# Patient Record
Sex: MALE | Race: WHITE
[De-identification: names, ages, dates, MRNs, and addresses within clinical notes are randomized per-mention and may not be internally consistent; named-entity substitution may affect disease eponyms.]

---

## 2020-01-06 ENCOUNTER — HOSPITAL ENCOUNTER (OUTPATIENT)
Dept: HOSPITAL 8 - STAR | Age: 50
Discharge: HOME | End: 2020-01-06
Attending: NEUROLOGICAL SURGERY
Payer: MEDICAID

## 2020-01-06 DIAGNOSIS — M51.36: ICD-10-CM

## 2020-01-06 DIAGNOSIS — Z01.818: Primary | ICD-10-CM

## 2020-01-06 LAB
ANION GAP SERPL CALC-SCNC: 4 MMOL/L (ref 5–15)
APTT BLD: 27 SECONDS (ref 25–31)
BASOPHILS # BLD AUTO: 0.05 X10^3/UL (ref 0–0.1)
BASOPHILS NFR BLD AUTO: 1 % (ref 0–1)
CALCIUM SERPL-MCNC: 9.3 MG/DL (ref 8.5–10.1)
CHLORIDE SERPL-SCNC: 105 MMOL/L (ref 98–107)
CREAT SERPL-MCNC: 0.87 MG/DL (ref 0.7–1.3)
EOSINOPHIL # BLD AUTO: 0.34 X10^3/UL (ref 0–0.4)
EOSINOPHIL NFR BLD AUTO: 6 % (ref 1–7)
ERYTHROCYTE [DISTWIDTH] IN BLOOD BY AUTOMATED COUNT: 12.8 % (ref 9.4–14.8)
INR PPP: 0.97 (ref 0.93–1.1)
LYMPHOCYTES # BLD AUTO: 2.34 X10^3/UL (ref 1–3.4)
LYMPHOCYTES NFR BLD AUTO: 39 % (ref 22–44)
MCH RBC QN AUTO: 31.7 PG (ref 27.5–34.5)
MCHC RBC AUTO-ENTMCNC: 34.2 G/DL (ref 33.2–36.2)
MCV RBC AUTO: 92.7 FL (ref 81–97)
MD: NO
MICROSCOPIC: (no result)
MONOCYTES # BLD AUTO: 0.43 X10^3/UL (ref 0.2–0.8)
MONOCYTES NFR BLD AUTO: 7 % (ref 2–9)
NEUTROPHILS # BLD AUTO: 2.83 X10^3/UL (ref 1.8–6.8)
NEUTROPHILS NFR BLD AUTO: 47 % (ref 42–75)
PLATELET # BLD AUTO: 272 X10^3/UL (ref 130–400)
PMV BLD AUTO: 7.3 FL (ref 7.4–10.4)
PROTHROMBIN TIME: 10.2 SECONDS (ref 9.6–11.5)
RBC # BLD AUTO: 4.96 X10^6/UL (ref 4.38–5.82)

## 2020-01-06 PROCEDURE — 80048 BASIC METABOLIC PNL TOTAL CA: CPT

## 2020-01-06 PROCEDURE — 85730 THROMBOPLASTIN TIME PARTIAL: CPT

## 2020-01-06 PROCEDURE — 85025 COMPLETE CBC W/AUTO DIFF WBC: CPT

## 2020-01-06 PROCEDURE — 81003 URINALYSIS AUTO W/O SCOPE: CPT

## 2020-01-06 PROCEDURE — 71046 X-RAY EXAM CHEST 2 VIEWS: CPT

## 2020-01-06 PROCEDURE — 36415 COLL VENOUS BLD VENIPUNCTURE: CPT

## 2020-01-06 PROCEDURE — 93005 ELECTROCARDIOGRAM TRACING: CPT

## 2020-01-06 PROCEDURE — 85610 PROTHROMBIN TIME: CPT

## 2020-03-03 ENCOUNTER — HOSPITAL ENCOUNTER (OUTPATIENT)
Dept: HOSPITAL 8 - STAR | Age: 50
Discharge: HOME | End: 2020-03-03
Attending: NEUROLOGICAL SURGERY
Payer: COMMERCIAL

## 2020-03-03 DIAGNOSIS — M51.36: ICD-10-CM

## 2020-03-03 DIAGNOSIS — M48.061: ICD-10-CM

## 2020-03-03 DIAGNOSIS — Z01.818: Primary | ICD-10-CM

## 2020-03-03 LAB
ANION GAP SERPL CALC-SCNC: 5 MMOL/L (ref 5–15)
APTT BLD: 26 SECONDS (ref 25–31)
BASOPHILS # BLD AUTO: 0.03 X10^3/UL (ref 0–0.1)
BASOPHILS NFR BLD AUTO: 1 % (ref 0–1)
CALCIUM SERPL-MCNC: 9 MG/DL (ref 8.5–10.1)
CHLORIDE SERPL-SCNC: 107 MMOL/L (ref 98–107)
CREAT SERPL-MCNC: 0.86 MG/DL (ref 0.7–1.3)
CULTURE INDICATED?: NO
EOSINOPHIL # BLD AUTO: 0.31 X10^3/UL (ref 0–0.4)
EOSINOPHIL NFR BLD AUTO: 6 % (ref 1–7)
ERYTHROCYTE [DISTWIDTH] IN BLOOD BY AUTOMATED COUNT: 12.8 % (ref 9.4–14.8)
INR PPP: 0.96 (ref 0.93–1.1)
LYMPHOCYTES # BLD AUTO: 1.91 X10^3/UL (ref 1–3.4)
LYMPHOCYTES NFR BLD AUTO: 37 % (ref 22–44)
MCH RBC QN AUTO: 31.4 PG (ref 27.5–34.5)
MCHC RBC AUTO-ENTMCNC: 33.9 G/DL (ref 33.2–36.2)
MCV RBC AUTO: 92.7 FL (ref 81–97)
MD: NO
MICROSCOPIC: (no result)
MONOCYTES # BLD AUTO: 0.33 X10^3/UL (ref 0.2–0.8)
MONOCYTES NFR BLD AUTO: 6 % (ref 2–9)
NEUTROPHILS # BLD AUTO: 2.64 X10^3/UL (ref 1.8–6.8)
NEUTROPHILS NFR BLD AUTO: 51 % (ref 42–75)
PLATELET # BLD AUTO: 272 X10^3/UL (ref 130–400)
PMV BLD AUTO: 7.6 FL (ref 7.4–10.4)
PROTHROMBIN TIME: 10.2 SECONDS (ref 9.6–11.5)
RBC # BLD AUTO: 5.01 X10^6/UL (ref 4.38–5.82)

## 2020-03-03 PROCEDURE — 80048 BASIC METABOLIC PNL TOTAL CA: CPT

## 2020-03-03 PROCEDURE — 81003 URINALYSIS AUTO W/O SCOPE: CPT

## 2020-03-03 PROCEDURE — 85025 COMPLETE CBC W/AUTO DIFF WBC: CPT

## 2020-03-03 PROCEDURE — 36415 COLL VENOUS BLD VENIPUNCTURE: CPT

## 2020-03-03 PROCEDURE — 85610 PROTHROMBIN TIME: CPT

## 2020-03-03 PROCEDURE — 85730 THROMBOPLASTIN TIME PARTIAL: CPT

## 2021-07-04 ENCOUNTER — HOSPITAL ENCOUNTER (INPATIENT)
Facility: MEDICAL CENTER | Age: 51
LOS: 4 days | DRG: 029 | End: 2021-07-09
Attending: EMERGENCY MEDICINE | Admitting: SURGERY
Payer: COMMERCIAL

## 2021-07-04 ENCOUNTER — APPOINTMENT (OUTPATIENT)
Dept: RADIOLOGY | Facility: MEDICAL CENTER | Age: 51
DRG: 029 | End: 2021-07-04
Attending: EMERGENCY MEDICINE
Payer: COMMERCIAL

## 2021-07-04 DIAGNOSIS — R20.2 PARESTHESIAS: ICD-10-CM

## 2021-07-04 DIAGNOSIS — S14.109A: ICD-10-CM

## 2021-07-04 DIAGNOSIS — R29.5 TRANSIENT PARALYSIS: ICD-10-CM

## 2021-07-04 DIAGNOSIS — I48.91 ATRIAL FIBRILLATION, UNSPECIFIED TYPE (HCC): ICD-10-CM

## 2021-07-04 LAB
ABO GROUP BLD: NORMAL
APTT PPP: 29.6 SEC (ref 24.7–36)
BLD GP AB SCN SERPL QL: NORMAL
ERYTHROCYTE [DISTWIDTH] IN BLOOD BY AUTOMATED COUNT: 37.9 FL (ref 35.9–50)
ETHANOL BLD-MCNC: <10.1 MG/DL (ref 0–10)
HCT VFR BLD AUTO: 40.7 % (ref 42–52)
HGB BLD-MCNC: 14.7 G/DL (ref 14–18)
INR PPP: 1.02 (ref 0.87–1.13)
MCH RBC QN AUTO: 31.7 PG (ref 27–33)
MCHC RBC AUTO-ENTMCNC: 36.1 G/DL (ref 33.7–35.3)
MCV RBC AUTO: 87.9 FL (ref 81.4–97.8)
PLATELET # BLD AUTO: 241 K/UL (ref 164–446)
PMV BLD AUTO: 9.5 FL (ref 9–12.9)
PROTHROMBIN TIME: 13.1 SEC (ref 12–14.6)
RBC # BLD AUTO: 4.63 M/UL (ref 4.7–6.1)
RH BLD: NORMAL
WBC # BLD AUTO: 8.3 K/UL (ref 4.8–10.8)

## 2021-07-04 PROCEDURE — 85610 PROTHROMBIN TIME: CPT

## 2021-07-04 PROCEDURE — 82077 ASSAY SPEC XCP UR&BREATH IA: CPT

## 2021-07-04 PROCEDURE — 72125 CT NECK SPINE W/O DYE: CPT

## 2021-07-04 PROCEDURE — 305949 HCHG RED TRAUMA ACT PRE-NOTIFY NO CC

## 2021-07-04 PROCEDURE — 700111 HCHG RX REV CODE 636 W/ 250 OVERRIDE (IP): Performed by: EMERGENCY MEDICINE

## 2021-07-04 PROCEDURE — 99285 EMERGENCY DEPT VISIT HI MDM: CPT

## 2021-07-04 PROCEDURE — 71045 X-RAY EXAM CHEST 1 VIEW: CPT

## 2021-07-04 PROCEDURE — 85730 THROMBOPLASTIN TIME PARTIAL: CPT

## 2021-07-04 PROCEDURE — 80053 COMPREHEN METABOLIC PANEL: CPT

## 2021-07-04 PROCEDURE — 86900 BLOOD TYPING SEROLOGIC ABO: CPT

## 2021-07-04 PROCEDURE — 86901 BLOOD TYPING SEROLOGIC RH(D): CPT

## 2021-07-04 PROCEDURE — 96374 THER/PROPH/DIAG INJ IV PUSH: CPT

## 2021-07-04 PROCEDURE — 85027 COMPLETE CBC AUTOMATED: CPT

## 2021-07-04 PROCEDURE — 70450 CT HEAD/BRAIN W/O DYE: CPT

## 2021-07-04 PROCEDURE — 86850 RBC ANTIBODY SCREEN: CPT

## 2021-07-04 RX ORDER — HYDROCODONE BITARTRATE AND ACETAMINOPHEN 10; 325 MG/1; MG/1
1-2 TABLET ORAL EVERY 6 HOURS PRN
Status: ON HOLD | COMMUNITY
End: 2021-07-09

## 2021-07-04 RX ORDER — HYDROMORPHONE HYDROCHLORIDE 1 MG/ML
0.5 INJECTION, SOLUTION INTRAMUSCULAR; INTRAVENOUS; SUBCUTANEOUS ONCE
Status: COMPLETED | OUTPATIENT
Start: 2021-07-05 | End: 2021-07-04

## 2021-07-04 RX ORDER — IBUPROFEN 200 MG
400 TABLET ORAL EVERY 6 HOURS PRN
Status: ON HOLD | COMMUNITY
End: 2021-07-09

## 2021-07-04 RX ADMIN — HYDROMORPHONE HYDROCHLORIDE 0.5 MG: 1 INJECTION, SOLUTION INTRAMUSCULAR; INTRAVENOUS; SUBCUTANEOUS at 23:59

## 2021-07-05 ENCOUNTER — HOSPITAL ENCOUNTER (INPATIENT)
Facility: REHABILITATION | Age: 51
End: 2021-07-05
Attending: PHYSICAL MEDICINE & REHABILITATION | Admitting: PHYSICAL MEDICINE & REHABILITATION
Payer: COMMERCIAL

## 2021-07-05 ENCOUNTER — APPOINTMENT (OUTPATIENT)
Dept: RADIOLOGY | Facility: MEDICAL CENTER | Age: 51
DRG: 029 | End: 2021-07-05
Attending: NURSE PRACTITIONER
Payer: COMMERCIAL

## 2021-07-05 PROBLEM — S14.129A CENTRAL CORD SYNDROME (HCC): Status: ACTIVE | Noted: 2021-07-05

## 2021-07-05 PROBLEM — R20.2 PARESTHESIA: Status: ACTIVE | Noted: 2021-07-05

## 2021-07-05 PROBLEM — R29.5 TRANSIENT PARALYSIS: Status: RESOLVED | Noted: 2021-07-05 | Resolved: 2021-07-05

## 2021-07-05 PROBLEM — Z11.52 ENCOUNTER FOR SCREENING FOR COVID-19: Status: ACTIVE | Noted: 2021-07-05
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
## 2021-07-05 PROBLEM — Z11.52 ENCOUNTER FOR SCREENING FOR COVID-19: Status: RESOLVED | Noted: 2021-07-05 | Resolved: 2021-07-05

## 2021-07-05 PROBLEM — Z53.09 CONTRAINDICATION TO DEEP VEIN THROMBOSIS (DVT) PROPHYLAXIS: Status: ACTIVE | Noted: 2021-07-05

## 2021-07-05 PROBLEM — T14.90XA TRAUMA: Status: ACTIVE | Noted: 2021-07-05

## 2021-07-05 PROBLEM — R29.5 TRANSIENT PARALYSIS: Status: ACTIVE | Noted: 2021-07-05

## 2021-07-05 LAB
ALBUMIN SERPL BCP-MCNC: 4.2 G/DL (ref 3.2–4.9)
ALBUMIN/GLOB SERPL: 1.4 G/DL
ALP SERPL-CCNC: 48 U/L (ref 30–99)
ALT SERPL-CCNC: 37 U/L (ref 2–50)
AMPHET UR QL SCN: NEGATIVE
ANION GAP SERPL CALC-SCNC: 12 MMOL/L (ref 7–16)
AST SERPL-CCNC: 33 U/L (ref 12–45)
BARBITURATES UR QL SCN: NEGATIVE
BENZODIAZ UR QL SCN: NEGATIVE
BILIRUB SERPL-MCNC: 0.5 MG/DL (ref 0.1–1.5)
BUN SERPL-MCNC: 20 MG/DL (ref 8–22)
BZE UR QL SCN: NEGATIVE
CALCIUM SERPL-MCNC: 9.1 MG/DL (ref 8.5–10.5)
CANNABINOIDS UR QL SCN: NEGATIVE
CHLORIDE SERPL-SCNC: 100 MMOL/L (ref 96–112)
CO2 SERPL-SCNC: 23 MMOL/L (ref 20–33)
CREAT SERPL-MCNC: 0.77 MG/DL (ref 0.5–1.4)
EKG IMPRESSION: NORMAL
GLOBULIN SER CALC-MCNC: 2.9 G/DL (ref 1.9–3.5)
GLUCOSE SERPL-MCNC: 86 MG/DL (ref 65–99)
METHADONE UR QL SCN: NEGATIVE
OPIATES UR QL SCN: NEGATIVE
OXYCODONE UR QL SCN: NEGATIVE
PCP UR QL SCN: NEGATIVE
POTASSIUM SERPL-SCNC: 3.8 MMOL/L (ref 3.6–5.5)
PROPOXYPH UR QL SCN: NEGATIVE
PROT SERPL-MCNC: 7.1 G/DL (ref 6–8.2)
SODIUM SERPL-SCNC: 135 MMOL/L (ref 135–145)

## 2021-07-05 PROCEDURE — 72156 MRI NECK SPINE W/O & W/DYE: CPT

## 2021-07-05 PROCEDURE — 80307 DRUG TEST PRSMV CHEM ANLYZR: CPT

## 2021-07-05 PROCEDURE — 700102 HCHG RX REV CODE 250 W/ 637 OVERRIDE(OP): Performed by: NURSE PRACTITIONER

## 2021-07-05 PROCEDURE — A9270 NON-COVERED ITEM OR SERVICE: HCPCS | Performed by: NURSE PRACTITIONER

## 2021-07-05 PROCEDURE — 700105 HCHG RX REV CODE 258: Performed by: NURSE PRACTITIONER

## 2021-07-05 PROCEDURE — A9270 NON-COVERED ITEM OR SERVICE: HCPCS | Performed by: SURGERY

## 2021-07-05 PROCEDURE — 51798 US URINE CAPACITY MEASURE: CPT

## 2021-07-05 PROCEDURE — A9270 NON-COVERED ITEM OR SERVICE: HCPCS | Performed by: NEUROLOGICAL SURGERY

## 2021-07-05 PROCEDURE — A9576 INJ PROHANCE MULTIPACK: HCPCS | Performed by: SURGERY

## 2021-07-05 PROCEDURE — 700117 HCHG RX CONTRAST REV CODE 255: Performed by: SURGERY

## 2021-07-05 PROCEDURE — 700111 HCHG RX REV CODE 636 W/ 250 OVERRIDE (IP): Performed by: NURSE PRACTITIONER

## 2021-07-05 PROCEDURE — 700102 HCHG RX REV CODE 250 W/ 637 OVERRIDE(OP): Performed by: SURGERY

## 2021-07-05 PROCEDURE — 93005 ELECTROCARDIOGRAM TRACING: CPT

## 2021-07-05 PROCEDURE — 700102 HCHG RX REV CODE 250 W/ 637 OVERRIDE(OP): Performed by: NEUROLOGICAL SURGERY

## 2021-07-05 PROCEDURE — 770022 HCHG ROOM/CARE - ICU (200)

## 2021-07-05 RX ORDER — AMOXICILLIN 250 MG
1 CAPSULE ORAL NIGHTLY
Status: DISCONTINUED | OUTPATIENT
Start: 2021-07-05 | End: 2021-07-08

## 2021-07-05 RX ORDER — ONDANSETRON 2 MG/ML
4 INJECTION INTRAMUSCULAR; INTRAVENOUS EVERY 4 HOURS PRN
Status: DISCONTINUED | OUTPATIENT
Start: 2021-07-05 | End: 2021-07-08

## 2021-07-05 RX ORDER — ENEMA 19; 7 G/133ML; G/133ML
1 ENEMA RECTAL
Status: DISCONTINUED | OUTPATIENT
Start: 2021-07-05 | End: 2021-07-08

## 2021-07-05 RX ORDER — BISACODYL 10 MG
10 SUPPOSITORY, RECTAL RECTAL
Status: DISCONTINUED | OUTPATIENT
Start: 2021-07-05 | End: 2021-07-08

## 2021-07-05 RX ORDER — MIDODRINE HYDROCHLORIDE 5 MG/1
10 TABLET ORAL
Status: DISCONTINUED | OUTPATIENT
Start: 2021-07-05 | End: 2021-07-05

## 2021-07-05 RX ORDER — OXYCODONE HYDROCHLORIDE 10 MG/1
10 TABLET ORAL
Status: DISCONTINUED | OUTPATIENT
Start: 2021-07-05 | End: 2021-07-08

## 2021-07-05 RX ORDER — DOCUSATE SODIUM 100 MG/1
100 CAPSULE, LIQUID FILLED ORAL 2 TIMES DAILY
Status: DISCONTINUED | OUTPATIENT
Start: 2021-07-05 | End: 2021-07-08

## 2021-07-05 RX ORDER — ACETAMINOPHEN 500 MG
1000 TABLET ORAL EVERY 6 HOURS
Status: DISCONTINUED | OUTPATIENT
Start: 2021-07-05 | End: 2021-07-05

## 2021-07-05 RX ORDER — MIDODRINE HYDROCHLORIDE 5 MG/1
5 TABLET ORAL
Status: DISCONTINUED | OUTPATIENT
Start: 2021-07-05 | End: 2021-07-06

## 2021-07-05 RX ORDER — POLYETHYLENE GLYCOL 3350 17 G/17G
1 POWDER, FOR SOLUTION ORAL 2 TIMES DAILY
Status: DISCONTINUED | OUTPATIENT
Start: 2021-07-05 | End: 2021-07-08

## 2021-07-05 RX ORDER — HYDROMORPHONE HYDROCHLORIDE 1 MG/ML
.5-1 INJECTION, SOLUTION INTRAMUSCULAR; INTRAVENOUS; SUBCUTANEOUS
Status: DISCONTINUED | OUTPATIENT
Start: 2021-07-05 | End: 2021-07-08

## 2021-07-05 RX ORDER — GABAPENTIN 100 MG/1
200 CAPSULE ORAL 3 TIMES DAILY
Status: DISCONTINUED | OUTPATIENT
Start: 2021-07-05 | End: 2021-07-06

## 2021-07-05 RX ORDER — OXYCODONE HYDROCHLORIDE 5 MG/1
5 TABLET ORAL
Status: DISCONTINUED | OUTPATIENT
Start: 2021-07-05 | End: 2021-07-08

## 2021-07-05 RX ORDER — SODIUM CHLORIDE 9 MG/ML
INJECTION, SOLUTION INTRAVENOUS CONTINUOUS
Status: DISCONTINUED | OUTPATIENT
Start: 2021-07-05 | End: 2021-07-05

## 2021-07-05 RX ORDER — ACETAMINOPHEN 500 MG
1000 TABLET ORAL EVERY 6 HOURS PRN
Status: DISCONTINUED | OUTPATIENT
Start: 2021-07-10 | End: 2021-07-05

## 2021-07-05 RX ORDER — AMOXICILLIN 250 MG
1 CAPSULE ORAL
Status: DISCONTINUED | OUTPATIENT
Start: 2021-07-05 | End: 2021-07-08

## 2021-07-05 RX ORDER — ACETAMINOPHEN 500 MG
1000 TABLET ORAL EVERY 6 HOURS
Status: DISCONTINUED | OUTPATIENT
Start: 2021-07-05 | End: 2021-07-09 | Stop reason: HOSPADM

## 2021-07-05 RX ORDER — ACETAMINOPHEN 500 MG
1000 TABLET ORAL EVERY 6 HOURS PRN
Status: DISCONTINUED | OUTPATIENT
Start: 2021-07-10 | End: 2021-07-09 | Stop reason: HOSPADM

## 2021-07-05 RX ORDER — METAXALONE 800 MG/1
800 TABLET ORAL 3 TIMES DAILY
Status: DISCONTINUED | OUTPATIENT
Start: 2021-07-05 | End: 2021-07-09 | Stop reason: HOSPADM

## 2021-07-05 RX ADMIN — OXYCODONE 5 MG: 5 TABLET ORAL at 11:34

## 2021-07-05 RX ADMIN — HYDROMORPHONE HYDROCHLORIDE 1 MG: 1 INJECTION, SOLUTION INTRAMUSCULAR; INTRAVENOUS; SUBCUTANEOUS at 09:38

## 2021-07-05 RX ADMIN — MIDODRINE HYDROCHLORIDE 5 MG: 5 TABLET ORAL at 11:35

## 2021-07-05 RX ADMIN — ACETAMINOPHEN 1000 MG: 500 TABLET ORAL at 03:27

## 2021-07-05 RX ADMIN — HYDROMORPHONE HYDROCHLORIDE 1 MG: 1 INJECTION, SOLUTION INTRAMUSCULAR; INTRAVENOUS; SUBCUTANEOUS at 06:00

## 2021-07-05 RX ADMIN — GADOTERIDOL 20 ML: 279.3 INJECTION, SOLUTION INTRAVENOUS at 08:47

## 2021-07-05 RX ADMIN — ACETAMINOPHEN 1000 MG: 500 TABLET ORAL at 18:01

## 2021-07-05 RX ADMIN — OXYCODONE HYDROCHLORIDE 10 MG: 10 TABLET ORAL at 03:27

## 2021-07-05 RX ADMIN — DOCUSATE SODIUM 50 MG AND SENNOSIDES 8.6 MG 1 TABLET: 8.6; 5 TABLET, FILM COATED ORAL at 03:27

## 2021-07-05 RX ADMIN — OXYCODONE HYDROCHLORIDE 10 MG: 10 TABLET ORAL at 18:35

## 2021-07-05 RX ADMIN — SODIUM CHLORIDE: 9 INJECTION, SOLUTION INTRAVENOUS at 03:28

## 2021-07-05 RX ADMIN — ONDANSETRON 4 MG: 2 INJECTION INTRAMUSCULAR; INTRAVENOUS at 19:26

## 2021-07-05 RX ADMIN — MIDODRINE HYDROCHLORIDE 5 MG: 5 TABLET ORAL at 18:00

## 2021-07-05 RX ADMIN — DOCUSATE SODIUM 100 MG: 100 CAPSULE ORAL at 18:01

## 2021-07-05 RX ADMIN — METAXALONE 800 MG: 800 TABLET ORAL at 18:01

## 2021-07-05 RX ADMIN — GABAPENTIN 200 MG: 100 CAPSULE ORAL at 13:24

## 2021-07-05 RX ADMIN — GABAPENTIN 200 MG: 100 CAPSULE ORAL at 09:32

## 2021-07-05 RX ADMIN — METAXALONE 800 MG: 800 TABLET ORAL at 11:35

## 2021-07-05 RX ADMIN — POLYETHYLENE GLYCOL 3350 1 PACKET: 17 POWDER, FOR SOLUTION ORAL at 18:01

## 2021-07-05 RX ADMIN — ACETAMINOPHEN 1000 MG: 500 TABLET ORAL at 09:31

## 2021-07-05 RX ADMIN — GABAPENTIN 200 MG: 100 CAPSULE ORAL at 18:00

## 2021-07-05 ASSESSMENT — ENCOUNTER SYMPTOMS
SENSORY CHANGE: 1
MYALGIAS: 1
NECK PAIN: 1
PSYCHIATRIC NEGATIVE: 1
RESPIRATORY NEGATIVE: 1
BACK PAIN: 1
WEAKNESS: 1

## 2021-07-05 ASSESSMENT — LIFESTYLE VARIABLES
TOTAL SCORE: 0
HAVE YOU EVER FELT YOU SHOULD CUT DOWN ON YOUR DRINKING: NO
ON A TYPICAL DAY WHEN YOU DRINK ALCOHOL HOW MANY DRINKS DO YOU HAVE: 0
TOTAL SCORE: 0
CONSUMPTION TOTAL: NEGATIVE
HAVE PEOPLE ANNOYED YOU BY CRITICIZING YOUR DRINKING: NO
ALCOHOL_USE: NO
HOW MANY TIMES IN THE PAST YEAR HAVE YOU HAD 5 OR MORE DRINKS IN A DAY: 0
AVERAGE NUMBER OF DAYS PER WEEK YOU HAVE A DRINK CONTAINING ALCOHOL: 0
EVER HAD A DRINK FIRST THING IN THE MORNING TO STEADY YOUR NERVES TO GET RID OF A HANGOVER: NO
TOTAL SCORE: 0
EVER FELT BAD OR GUILTY ABOUT YOUR DRINKING: NO

## 2021-07-05 ASSESSMENT — PAIN DESCRIPTION - PAIN TYPE
TYPE: ACUTE PAIN

## 2021-07-05 ASSESSMENT — PATIENT HEALTH QUESTIONNAIRE - PHQ9
2. FEELING DOWN, DEPRESSED, IRRITABLE, OR HOPELESS: NOT AT ALL
1. LITTLE INTEREST OR PLEASURE IN DOING THINGS: NOT AT ALL
SUM OF ALL RESPONSES TO PHQ9 QUESTIONS 1 AND 2: 0

## 2021-07-05 ASSESSMENT — FIBROSIS 4 INDEX
FIB4 SCORE: 1.1
FIB4 SCORE: 1.1
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
## 2021-07-05 NOTE — PROGRESS NOTES
4 Eyes Skin Assessment Completed by Santos RN and SOM Donaldson.    Head WDL  Ears WDL  Nose WDL  Mouth WDL  Neck WDL  Breast/Chest WDL  Shoulder Blades WDL  Spine WDL  (R) Arm/Elbow/Hand WDL  (L) Arm/Elbow/Hand WDL  Abdomen WDL  Groin WDL  Scrotum/Coccyx/Buttocks WDL  (R) Leg WDL  (L) Leg WDL  (R) Heel/Foot/Toe WDL  (L) Heel/Foot/Toe WDL          Devices In Places Pulse Ox and Cervical Collar      Interventions In Place Pressure Redistribution Mattress    Possible Skin Injury No    Pictures Uploaded Into Epic N/A  Wound Consult Placed N/A  RN Wound Prevention Protocol Ordered No

## 2021-07-05 NOTE — PROGRESS NOTES
"TRAUMA TERTIARY SURVEY     Mental status adequate for full examination?: Yes    Spine cleared (radiologically and/or clinically): No    PHYSICAL EXAMINATION:  Vitals: /88   Pulse 79   Temp 36.6 °C (97.8 °F) (Temporal)   Resp 16   Ht 1.93 m (6' 4\")   Wt 92.6 kg (204 lb 2.3 oz)   SpO2 96%   BMI 24.85 kg/m²   Constitutional:     General Appearance: appears stated age.  HEENT:     No significant external craniofacial trauma. The pupils are equal, round, and reactive to light bilaterally. The extraocular muscles are intact bilaterally.. The nares and oropharynx are clear. The midface and jaw are stable. No malocclusion is evident.  Neck:    The cervical spine is immobilized with a hard collar.  Respiratory:   Inspection: Unlabored respirations, no intercostal retractions, paradoxical motion, or accessory muscle use.   Palpation:  The chest is nontender. The clavicles are non deformed bilaterally..   Auscultation: clear to auscultation.  Cardiovascular:   Auscultation: normal.   Peripheral Pulses: Normal.   Abdomen:   Abdomen is soft, nontender, without organomegaly or masses.  Genitourinary:   (MALE): not observed.  Musculoskeletal:   The pelvis is stable.  No significant angulation, deformity, or soft tissue injury involving the upper and lower extremities. Normal range of motion.   Back:   The thoracolumbar spine was examined. Examination is remarkable for tenderness in the cervical region.  Skin:   The skin is warm and dry.  Neurologic:    [place] Coma Scale (GCS) 15 [id number]. Neurologic examination revealed severe pain to BUE with resistance to move.  =. BLE strength 5/5  Psychiatric:   The patient is a bit anxious.    IMAGING:  CT-HEAD W/O   Final Result      No acute intracranial abnormality.      CT-CSPINE WITHOUT PLUS RECONS   Final Result      1.  Prior cervical discectomy and anterior fusion from C5 to C7.   2.  Mild multilevel degenerative change.   3.  No cervical spine fracture or " subluxation.      DX-CHEST-LIMITED (1 VIEW)   Final Result      No evidence for acute intrathoracic injury.      US-ABORTED US PROCEDURE    (Results Pending)   MR-CERVICAL SPINE-WITH & W/O    (Results Pending)     All current laboratory studies/radiology exams reviewed: Yes    Completed Consultations:  none     Pending Consultations:  Neurosurgery - Dr. Hall  PM&R    Newly Identified Diagnoses and Injuries:  None    TOTAL RAP SCORE:  RAP Score Total: 2      ETOH Screening  CAGE Score: 0  Assessment complete date: 7/5/2021 (neg CAGE, neg LIDIA)
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
## 2021-07-05 NOTE — ED NOTES
Med rec updated and complete.  No antibiotic use in last 14 days.        Home pharmacy [facility] [phone] th [phone]

## 2021-07-05 NOTE — ASSESSMENT & PLAN NOTE
Recent C5-C7 fusion about 5-6 weeks ago.  CT imaging with no cervical spine fracture or subluxation. Prior cervical discectomy and anterior fusion from C5 to C7.  MR imaging of the cervical spine demonstrated prior anterior fusion at C5-C7, multilevel multifactorial degenerative changes, severe central canal and bilateral neural foraminal narrowing at C4-C5.  Midodrine.   7/8 Anterior cervical diskectomy and fusion, C5-6, C6-7.  Roberto Hall MD. Neurosurgeon. [facility].
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
## 2021-07-05 NOTE — PROGRESS NOTES
1045: Pt arrived to ICU    Vitals:   · HR: 81  · BP: 127/80  · RR: 11  · SaO2: 93 on room air   · Wt: 93.5kg  · Temp 97.9   _____________________________________________________________    2 RN skin check completed with Elizabeth KEARNS    Areas of concern/Skin observations:  · LLQ - tape tanya/redness   · Old healing bruising to the BLE  · Dry heels    Devices in use, assessed under and interventions (as appropriate) for skin protection:  · SCDs, BP cuff, SaO2 monitor  · c-collar / mepilex replaced / skin under intact    Interventions in place such as:   · q2 hour turns  · Keeping skin clean and dry  · Use of products such as barrier wipes/cream  · Waffle cushion while OOB: TBD  · Bed Type: Pressure redistribution / trauma bed  · Mobility: TBD  ______________________________________________________________    Personal belongings:   · Wedding ring  · Wallet

## 2021-07-05 NOTE — ASSESSMENT & PLAN NOTE
Prophylactic anticoagulation for thrombotic prevention initially contraindicated secondary to elevated bleeding risk.  7/6 Trauma screening bilateral lower extremity venous duplex negative for above knee DVT.

## 2021-07-05 NOTE — CARE PLAN
The patient is Watcher - Medium risk of patient condition declining or worsening    Shift Goals  Clinical Goals: neurosurgeon consult  Patient Goals: decrease pain    Progress made toward(s) clinical / shift goals:  medicated for pain  Problem: Pain - Standard  Goal: Alleviation of pain or a reduction in pain to the patient’s comfort goal  Outcome: Progressing     Problem: Knowledge Deficit - Standard  Goal: Patient and family/care givers will demonstrate understanding of plan of care, disease process/condition, diagnostic tests and medications  Outcome: Progressing     Problem: Skin Integrity  Goal: Skin integrity is maintained or improved  Outcome: Progressing     Problem: Fall Risk  Goal: Patient will remain free from falls  Outcome: Progressing       Patient is not progressing towards the following goals:

## 2021-07-05 NOTE — PROGRESS NOTES
Neurosurgery to bedside to update patient.     Left voicemail for wife Abby with transfer update and that pt will be moving to SICU room 108.    ICU here to transfer patient. Pt states only belonging is his wallet. This was accounted for with patient at time of transfer.
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
## 2021-07-05 NOTE — ED NOTES
Pt c/o neck and bilateral arm/hand pain. Pt very uncomfortable with current c-collar. ERP Dr. Xie notified. Conklin J collar ordered. Pain medication ordered. See MAR.

## 2021-07-05 NOTE — PROGRESS NOTES
Wife Abby called for updated, able to speak with patient before he left floor for MRI.     Cell: [phone]

## 2021-07-05 NOTE — ASSESSMENT & PLAN NOTE
Post injury paresthesia in bilateral upper extremities.  Roberto Hall MD. Neurosurgeon. [facility].
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
## 2021-07-06 ENCOUNTER — APPOINTMENT (OUTPATIENT)
Dept: RADIOLOGY | Facility: MEDICAL CENTER | Age: 51
DRG: 029 | End: 2021-07-06
Attending: NURSE PRACTITIONER
Payer: COMMERCIAL

## 2021-07-06 PROBLEM — Z02.9 DISCHARGE PLANNING ISSUES: Status: ACTIVE | Noted: 2021-07-06

## 2021-07-06 PROBLEM — Z75.8 DISCHARGE PLANNING ISSUES: Status: ACTIVE | Noted: 2021-07-06

## 2021-07-06 PROCEDURE — A9270 NON-COVERED ITEM OR SERVICE: HCPCS | Performed by: NURSE PRACTITIONER

## 2021-07-06 PROCEDURE — 700102 HCHG RX REV CODE 250 W/ 637 OVERRIDE(OP): Performed by: NEUROLOGICAL SURGERY

## 2021-07-06 PROCEDURE — 700111 HCHG RX REV CODE 636 W/ 250 OVERRIDE (IP): Performed by: NURSE PRACTITIONER

## 2021-07-06 PROCEDURE — 770001 HCHG ROOM/CARE - MED/SURG/GYN PRIV*

## 2021-07-06 PROCEDURE — 700102 HCHG RX REV CODE 250 W/ 637 OVERRIDE(OP): Performed by: NURSE PRACTITIONER

## 2021-07-06 PROCEDURE — A9270 NON-COVERED ITEM OR SERVICE: HCPCS | Performed by: SURGERY

## 2021-07-06 PROCEDURE — 93970 EXTREMITY STUDY: CPT

## 2021-07-06 PROCEDURE — 97165 OT EVAL LOW COMPLEX 30 MIN: CPT

## 2021-07-06 PROCEDURE — A9270 NON-COVERED ITEM OR SERVICE: HCPCS | Performed by: PHYSICAL MEDICINE & REHABILITATION

## 2021-07-06 PROCEDURE — A9270 NON-COVERED ITEM OR SERVICE: HCPCS | Performed by: NEUROLOGICAL SURGERY

## 2021-07-06 PROCEDURE — 700102 HCHG RX REV CODE 250 W/ 637 OVERRIDE(OP): Performed by: PHYSICAL MEDICINE & REHABILITATION

## 2021-07-06 PROCEDURE — 99232 SBSQ HOSP IP/OBS MODERATE 35: CPT | Performed by: SURGERY

## 2021-07-06 PROCEDURE — 99221 1ST HOSP IP/OBS SF/LOW 40: CPT | Performed by: PHYSICAL MEDICINE & REHABILITATION

## 2021-07-06 PROCEDURE — 700102 HCHG RX REV CODE 250 W/ 637 OVERRIDE(OP): Performed by: SURGERY

## 2021-07-06 PROCEDURE — 97162 PT EVAL MOD COMPLEX 30 MIN: CPT

## 2021-07-06 RX ORDER — MIDODRINE HYDROCHLORIDE 5 MG/1
5 TABLET ORAL EVERY 8 HOURS
Status: DISCONTINUED | OUTPATIENT
Start: 2021-07-06 | End: 2021-07-06

## 2021-07-06 RX ORDER — MIDODRINE HYDROCHLORIDE 5 MG/1
5 TABLET ORAL ONCE
Status: COMPLETED | OUTPATIENT
Start: 2021-07-06 | End: 2021-07-06

## 2021-07-06 RX ORDER — GABAPENTIN 400 MG/1
400 CAPSULE ORAL 3 TIMES DAILY
Status: DISCONTINUED | OUTPATIENT
Start: 2021-07-06 | End: 2021-07-08

## 2021-07-06 RX ORDER — MIDODRINE HYDROCHLORIDE 5 MG/1
10 TABLET ORAL EVERY 8 HOURS
Status: DISCONTINUED | OUTPATIENT
Start: 2021-07-06 | End: 2021-07-09

## 2021-07-06 RX ADMIN — ACETAMINOPHEN 1000 MG: 500 TABLET ORAL at 17:56

## 2021-07-06 RX ADMIN — OXYCODONE HYDROCHLORIDE 10 MG: 10 TABLET ORAL at 00:03

## 2021-07-06 RX ADMIN — MIDODRINE HYDROCHLORIDE 10 MG: 5 TABLET ORAL at 21:03

## 2021-07-06 RX ADMIN — ONDANSETRON 4 MG: 2 INJECTION INTRAMUSCULAR; INTRAVENOUS at 08:39

## 2021-07-06 RX ADMIN — GABAPENTIN 200 MG: 100 CAPSULE ORAL at 11:22

## 2021-07-06 RX ADMIN — ACETAMINOPHEN 1000 MG: 500 TABLET ORAL at 00:03

## 2021-07-06 RX ADMIN — OXYCODONE 5 MG: 5 TABLET ORAL at 08:38

## 2021-07-06 RX ADMIN — DOCUSATE SODIUM 100 MG: 100 CAPSULE ORAL at 17:58

## 2021-07-06 RX ADMIN — METAXALONE 800 MG: 800 TABLET ORAL at 05:32

## 2021-07-06 RX ADMIN — GABAPENTIN 200 MG: 100 CAPSULE ORAL at 05:32

## 2021-07-06 RX ADMIN — ACETAMINOPHEN 1000 MG: 500 TABLET ORAL at 05:32

## 2021-07-06 RX ADMIN — DOCUSATE SODIUM 50 MG AND SENNOSIDES 8.6 MG 1 TABLET: 8.6; 5 TABLET, FILM COATED ORAL at 21:03

## 2021-07-06 RX ADMIN — MIDODRINE HYDROCHLORIDE 5 MG: 5 TABLET ORAL at 08:39

## 2021-07-06 RX ADMIN — METAXALONE 800 MG: 800 TABLET ORAL at 17:57

## 2021-07-06 RX ADMIN — MIDODRINE HYDROCHLORIDE 5 MG: 5 TABLET ORAL at 16:17

## 2021-07-06 RX ADMIN — OXYCODONE HYDROCHLORIDE 10 MG: 10 TABLET ORAL at 05:34

## 2021-07-06 RX ADMIN — OXYCODONE HYDROCHLORIDE 10 MG: 10 TABLET ORAL at 21:04

## 2021-07-06 RX ADMIN — DOCUSATE SODIUM 100 MG: 100 CAPSULE ORAL at 05:34

## 2021-07-06 RX ADMIN — MIDODRINE HYDROCHLORIDE 5 MG: 5 TABLET ORAL at 11:22

## 2021-07-06 RX ADMIN — OXYCODONE HYDROCHLORIDE 10 MG: 10 TABLET ORAL at 12:44

## 2021-07-06 RX ADMIN — ACETAMINOPHEN 1000 MG: 500 TABLET ORAL at 11:22

## 2021-07-06 RX ADMIN — ACETAMINOPHEN 1000 MG: 500 TABLET ORAL at 23:51

## 2021-07-06 RX ADMIN — GABAPENTIN 400 MG: 400 CAPSULE ORAL at 17:57

## 2021-07-06 RX ADMIN — MIDODRINE HYDROCHLORIDE 5 MG: 5 TABLET ORAL at 01:46

## 2021-07-06 RX ADMIN — METAXALONE 800 MG: 800 TABLET ORAL at 11:22

## 2021-07-06 ASSESSMENT — ENCOUNTER SYMPTOMS
WEAKNESS: 1
MYALGIAS: 1
NECK PAIN: 1
EYES NEGATIVE: 1
FOCAL WEAKNESS: 1
PSYCHIATRIC NEGATIVE: 1
BACK PAIN: 1
GASTROINTESTINAL NEGATIVE: 1
RESPIRATORY NEGATIVE: 1
SENSORY CHANGE: 1

## 2021-07-06 ASSESSMENT — COGNITIVE AND FUNCTIONAL STATUS - GENERAL
WALKING IN HOSPITAL ROOM: A LITTLE
EATING MEALS: A LITTLE
DRESSING REGULAR LOWER BODY CLOTHING: A LITTLE
PERSONAL GROOMING: A LITTLE
MOVING TO AND FROM BED TO CHAIR: A LITTLE
TOILETING: A LITTLE
DRESSING REGULAR UPPER BODY CLOTHING: A LITTLE
TURNING FROM BACK TO SIDE WHILE IN FLAT BAD: A LITTLE
MOBILITY SCORE: 17
SUGGESTED CMS G CODE MODIFIER MOBILITY: CK
CLIMB 3 TO 5 STEPS WITH RAILING: A LOT
HELP NEEDED FOR BATHING: A LITTLE
DAILY ACTIVITIY SCORE: 18
SUGGESTED CMS G CODE MODIFIER DAILY ACTIVITY: CK
MOVING FROM LYING ON BACK TO SITTING ON SIDE OF FLAT BED: A LITTLE
STANDING UP FROM CHAIR USING ARMS: A LITTLE

## 2021-07-06 ASSESSMENT — PAIN DESCRIPTION - PAIN TYPE
TYPE: ACUTE PAIN

## 2021-07-06 ASSESSMENT — FIBROSIS 4 INDEX: FIB4 SCORE: 1.1

## 2021-07-06 ASSESSMENT — GAIT ASSESSMENTS
DISTANCE (FEET): 100
DEVIATION: DECREASED BASE OF SUPPORT
GAIT LEVEL OF ASSIST: MINIMAL ASSIST

## 2021-07-06 ASSESSMENT — ACTIVITIES OF DAILY LIVING (ADL): TOILETING: INDEPENDENT

## 2021-07-06 NOTE — DISCHARGE PLANNING
"Anticipated Discharge Disposition: Home with family support (& HHC?) vs IRF    Action: Chart reviewed and assessment completed. Patient lives locally in a 2 story house with spouse and 5 children. Prior to current hospitalization, patient was independent with all ADL's/IADL's. 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     Reviewed Dr. Norman's consult. He stated, \"We will continue to follow patient.  He is expecting cervical surgery on 7/8 by Dr. JASS Renner MD, we will get another round of therapy afterwards to make sure he is still within functional limits to return home.  If patient is requiring intensive rehabilitation after his surgery, we are happy to take him. At the moment, patient is very strong and only has some weakness in his hands and is not experiencing neurogenic bowel or bladder, therefore recommendations are to plan for discharge home under the supportive care of his family.\"     OT currently recommending HH upon discharge.   PCP listed is Joey Mooney    Barriers to Discharge: Medical clearance and determination of dispo     Plan: Continue to assist with social/dc needs    Care Transition Team Assessment    Information Source  Orientation Level: Oriented X4  Information Given By: Patient  Informant's Name: & chart review  Who is responsible for making decisions for patient? : Patient    Readmission Evaluation  Is this a readmission?: No    Elopement Risk  Legal Hold: No  Ambulatory or Self Mobile in Wheelchair: No-Not an Elopement Risk  Elopement Risk: Not at Risk for Elopement    Interdisciplinary Discharge Planning  Lives with - Patient's Self Care Capacity: Spouse, Child Less than 18 Years of Age  Housing / Facility: 2 Story House  Prior Services: Home-Independent    Discharge Preparedness  What is your plan after discharge?: Uncertain - pending medical team collaboration, Home with help (IRF vs home with support)  What are your discharge supports?: Child, " Spouse  Prior Functional Level: Ambulatory, Drives Self, Independent with Activities of Daily Living, Independent with Medication Management    Functional Assesment  Prior Functional Level: Ambulatory, Drives Self, Independent with Activities of Daily Living, Independent with Medication Management    Finances  Financial Barriers to Discharge: No  Prescription Coverage: Yes    Advance Directive  Advance Directive?: None  Advance Directive offered?: AD Booklet refused    Psychological Assessment  History of Substance Abuse: None  History of Psychiatric Problems: No  Non-compliant with Treatment: No  Newly Diagnosed Illness: Yes    Discharge Risks or Barriers  Discharge risks or barriers?: No    Anticipated Discharge Information  Discharge Disposition: Still a Patient

## 2021-07-06 NOTE — CONSULTS
Physical Medicine and Rehabilitation Consultation              Date of initial consultation: 7/6/2021  Consulting provider: JARAD Posada   Reason for consultation: assess for acute inpatient rehab appropriateness  LOS: 1 Day(s)    Chief complaint: Central cord syndrome    HPI: The patient is a 51 y.o. right hand dominant male with a past medical history of C5-7 cervical fusion by Dr. Zurdo JACOBS;  who presented on 7/4/2021  9:49 PM with transient quadriparesis after wakeboarding fall.  Seen by neurosurgery, MRI neck showed C4-5 severe critical stenosis with no cord signal change, no bruise, no hematoma, however consistent with cord effacement.  At the time he was seen by neurosurgery, he was complaining of bilateral paresthesias in the upper extremities with some weakness in the distal more than proximal muscles, consistent with central cord injury.  Patient did not require surgical correction emergently, but Dr. Roberto Hall reports that it is likely he will need some form of decompression, to be determined by Dr. Zurdo JACOBS.    The patient currently reports neuropathic pain in all 4 limbs, weakness in bilateral hands.  Patient endorses urinating freely, having bowel movements, no chest pain or shortness of breath.  Patient states he will undergo surgery on Thursday.    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  No evidence of acute fracture, cord contusion or epidural hematoma  2.  Prior anterior fusion at C5-C7  3.  Multilevel multifactorial degenerative changes  4.  Severe central canal and BILATERAL neural foraminal narrowing at C4-C5  5.  Other areas of central canal and neural foraminal narrowing as described above    PROCEDURES:  Pending cervical  "procedure Thursday, 7/8/2021    PMH:  History reviewed. No pertinent past medical history.    PSH:  No past surgical history on file.    FHX:  Non-pertinent to today's issues    Medications:  Current Facility-Administered Medications   Medication Dose   • Respiratory Therapy Consult     • docusate sodium (COLACE) capsule 100 mg  100 mg   • senna-docusate (PERICOLACE or SENOKOT S) 8.6-50 MG per tablet 1 tablet  1 tablet   • senna-docusate (PERICOLACE or SENOKOT S) 8.6-50 MG per tablet 1 tablet  1 tablet   • polyethylene glycol/lytes (MIRALAX) PACKET 1 Packet  1 Packet   • magnesium hydroxide (MILK OF MAGNESIA) suspension 30 mL  30 mL   • bisacodyl (DULCOLAX) suppository 10 mg  10 mg   • fleet enema 133 mL  1 Each   • oxyCODONE immediate-release (ROXICODONE) tablet 5 mg  5 mg    Or   • oxyCODONE immediate release (ROXICODONE) tablet 10 mg  10 mg    Or   • HYDROmorphone (Dilaudid) injection 0.5-1 mg  0.5-1 mg   • ondansetron (ZOFRAN) syringe/vial injection 4 mg  4 mg   • gabapentin (NEURONTIN) capsule 200 mg  200 mg   • metaxalone (Skelaxin) tablet 800 mg  800 mg   • midodrine (PROAMATINE) tablet 5 mg  5 mg   • acetaminophen (TYLENOL) tablet 1,000 mg  1,000 mg    Followed by   • [START ON 7/10/2021] acetaminophen (TYLENOL) tablet 1,000 mg  1,000 mg       Allergies:  Allergies   Allergen Reactions   • Morphine Unspecified     Chest pain   • Pcn [Penicillins]      Childhood allergy       Physical Exam:  Vitals: /79   Pulse (!) 59   Temp 36.5 °C (97.7 °F) (Temporal)   Resp 13   Ht 1.93 m (6' 4\")   Wt 94.6 kg (208 lb 8.9 oz)   SpO2 94%   Gen: NAD  Head: NC/AT  Eyes/ Nose/ Mouth: PERRLA, moist mucous membranes  Cardio: RRR, good distal perfusion, warm extremities  Pulm: normal respiratory effort, no cyanosis   Abd: Soft NTND, negative borborygmi   Ext: No peripheral edema. No calf tenderness. No clubbing.    Mental status: answers questions appropriately follows commands  Speech: fluent, no aphasia or " dysarthria    Motor:      Upper Extremity  Myotome R L   Shoulder flexion C5 5 5   Elbow flexion C5 5 5   Wrist extension C6 4/5 4/5   Elbow extension C7 5 5   Finger flexion C8 4/5 4/5   Finger abduction T1 4/5 4/5     Lower Extremity Myotome R L   Hip flexion L2 5 5   Knee extension L3 5 5   Ankle dorsiflexion L4 5 5   Toe extension L5 5 5   Ankle plantarflexion S1 5 5       DTRs:  Right  Left    Brachioradialis  2+  2+   Patella tendon  2+ 2+       Labs: Reviewed and significant for   Recent Labs     07/04/21  2154   RBC 4.63*   HEMOGLOBIN 14.7   HEMATOCRIT 40.7*   PLATELETCT 241   PROTHROMBTM 13.1   APTT 29.6   INR 1.02     Recent Labs     07/04/21  2154   SODIUM 135   POTASSIUM 3.8   CHLORIDE 100   CO2 23   GLUCOSE 86   BUN 20   CREATININE 0.77   CALCIUM 9.1     No results found for this or any previous visit (from the past 24 hour(s)).      ASSESSMENT:  Patient is a 51 y.o. male admitted with central cord syndrome after fall while wakeboarding with history of prior C5-7 fusion now pending surgery on 7/8 for traumatic disc at C4/5    [facility] Code / Diagnosis to Support: 0004.120 - Spinal Cord Dysfunction, Non-Traumatic: Quadriplegia, Unspecified    Rehabilitation: Impaired ADLs and mobility  Currently not requiring IPR, will continue to follow    Barriers to transfer include: Insurance authorization, TCCs to verify disposition, medical clearance and bed availability     Additional Recommendations:  -We will continue to follow patient.  He is expecting cervical surgery on 7/8 by Dr. JASS Renner MD, we will get another round of therapy afterwards to make sure he is still within functional limits to return home.  If patient is requiring intensive rehabilitation after his surgery, we are happy to take him.  At the moment, patient is very strong and only has some weakness in his hands and is not experiencing neurogenic bowel or bladder, therefore recommendations are to plan for discharge home under the supportive care of  his family.  -Increasing gabapentin to 400 mg 3 times daily for neuropathic pain.  High risk medication, labs reviewed, creatinine clearance 142.5, GFR greater than 60.  Pretreating patient is neuropathic pain with this higher dose of gabapentin will help postoperative pain and likely decrease his narcotic dependence afterwards.    -Recommend abstaining from opioid medication given patient's 10 years of sobriety  -Agree with midodrine 5 mg 3 times daily  -Will need repeat PT OT evaluations after surgery  -PMR to follow in the periphery for rehab appropriateness, please reach out with questions or request for medical management      Medical Complexity:  Central cord syndrome  Neuropathic pain      DVT PPX: SCDs      Thank you for allowing us to participate in the care of this patient.     Isaiah Norman, DO   Physical Medicine and Rehabilitation

## 2021-07-06 NOTE — PROGRESS NOTES
Trauma / Surgical Daily Progress Note    Date of Service  7/6/2021    Chief Complaint  51 y.o. male admitted 7/4/2021 with central cord syndrome and paresthesias.    Interval Events    Improved BP control on midodrine.   Neurosurgery note reviewed. Tentatively to OR 7/8.   Transfer to ortho/spine latif.   Disposition: Physiatry following.   Counseled.    Review of Systems  Review of Systems   Constitutional: Positive for malaise/fatigue.   HENT: Negative.    Eyes: Negative.    Respiratory: Negative.    Gastrointestinal: Negative.    Genitourinary:        Voiding   Musculoskeletal: Positive for back pain, myalgias and neck pain.   Neurological: Positive for sensory change, focal weakness and weakness.   Psychiatric/Behavioral: Negative.    All other systems reviewed and are negative.       Vital Signs  Temp:  [35.8 °C (96.5 °F)-36.5 °C (97.7 °F)] 35.8 °C (96.5 °F)  Pulse:  [50-90] 55  Resp:  [9-27] 12  BP: ()/() 131/82  SpO2:  [92 %-96 %] 93 %    Physical Exam  Physical Exam  Vitals and nursing note reviewed.   Constitutional:       General: He is not in acute distress.     Appearance: Normal appearance.      Interventions: Cervical collar in place.   HENT:      Right Ear: External ear normal.      Left Ear: External ear normal.      Nose: Nose normal.      Mouth/Throat:      Mouth: Mucous membranes are moist.      Pharynx: Oropharynx is clear.   Eyes:      General:         Right eye: No discharge.         Left eye: No discharge.      Conjunctiva/sclera: Conjunctivae normal.      Pupils: Pupils are equal, round, and reactive to light.   Cardiovascular:      Rate and Rhythm: Normal rate and regular rhythm.      Pulses: Normal pulses.   Pulmonary:      Effort: Pulmonary effort is normal. No respiratory distress.      Breath sounds: Normal breath sounds.   Abdominal:      General: There is no distension.      Palpations: Abdomen is soft.      Tenderness: There is no abdominal tenderness.   Musculoskeletal:          General: No swelling, tenderness, deformity or signs of injury.      Cervical back: Spinous process tenderness and muscular tenderness present.   Skin:     General: Skin is warm and dry.      Capillary Refill: Capillary refill takes less than 2 seconds.   Neurological:      Mental Status: He is alert and oriented to person, place, and time.      GCS: GCS eye subscore is 4. GCS verbal subscore is 5. GCS motor subscore is 6.      Motor: Weakness present.      Comments: Paraesthesia and weakness to bilateral upper extremities.  Grasps equal.      Psychiatric:         Mood and Affect: Mood normal.         Behavior: Behavior normal.         Thought Content: Thought content normal.         Laboratory  No results found for this or any previous visit (from the past 24 hour(s)).    Fluids    Intake/Output Summary (Last 24 hours) at 7/6/2021 1339  Last data filed at 7/6/2021 1200  Gross per 24 hour   Intake --   Output 950 ml   Net -950 ml       Core Measures & Quality Metrics  Labs reviewed, Medications reviewed and Radiology images reviewed        DVT Prophylaxis: Contraindicated - High bleeding risk  DVT prophylaxis - mechanical: SCDs  Ulcer prophylaxis: Not indicated    Assessed for rehab: Patient was assess for and/or received rehabilitation services during this hospitalization    RAP Score Total: 2    ETOH Screening  CAGE Score: 0  Assessment complete date: 7/5/2021 (neg CAGE, neg LIDIA)        Assessment/Plan  Central cord syndrome (HCC)- (present on admission)  Assessment & Plan  Recent C5-C7 fusion about 5-6 weeks ago.  CT imaging with no cervical spine fracture or subluxation. Prior cervical discectomy and anterior fusion from C5 to C7.  MR imaging of the cervical spine demonstrated prior anterior fusion at C5-C7, multilevel multifactorial degenerative changes, severe central canal and bilateral neural foraminal narrowing at C4-C5.  Midodrine.   7/8 Plan for surgery.  Roberto Hall MD. Neurosurgeon. [facility].    Paresthesia- (present on admission)  Assessment & Plan  Post injury paresthesia in bilateral upper extremities.  Roberto Hall MD. Neurosurgeon. [facility].    Discharge planning issues  Assessment & Plan  Date of admission: 7/4/2021  Date: 7/6 Transfer orders from SICU  Date: PM&R consult on admission.  Cleared for discharge: No  Discharge delayed: No    Discharge date: TBD        Contraindication to deep vein thrombosis (DVT) prophylaxis- (present on admission)  Assessment & Plan  Prophylactic anticoagulation for thrombotic prevention initially contraindicated secondary to elevated bleeding risk.  7/6 Trauma surveillance venous duplex scanning ordered.    Trauma- (present on admission)  Assessment & Plan  Wakeboarding injury.  Transient upper extremity neurologic symptoms.  Trauma Red Activation.  Christiana Mclean MD. Trauma Surgery.         Discussed patient condition with Patient and trauma surgery, Shravan Rainey.

## 2021-07-06 NOTE — PROGRESS NOTES
Neurosurgery    Patient seen and examined. Discussed the diagnosis with the patient and the plan for his surgery on the 8th of July.    On examine, his blood pressure is 132/102    Perrl  Speech fluent  Motor 5/5 with the exception of the hands at 4/5 with dysesthesia.      Impression  SP C56, C67 acdf with traumatic disc at C45  Has cord compression and some signal change with corresponding exam  BP up on Mididrine  Plan for surgery on Thrusday.

## 2021-07-06 NOTE — THERAPY
Occupational Therapy   Initial Evaluation     Patient Name: Osmani Fitzpatrick  Age:  51 y.o., Sex:  male  Medical Record #: [id number]  Today's Date: 7/6/2021     Precautions  Precautions: Fall Risk, Spinal / Back Precautions , Cervical Collar    Comments: MAP >85; c-collar AAT    Assessment  Patient is 51 y.o. male admitted after wakeboarding accident. Recent C5-C7 fusion about 5-6 weeks ago. He came in with BUE weakness and paresthesias however most of this has resolved. Dr. Renner in room, requesting therapy evals pre-op w/ plan to do surgery on Thurs 7/8.  Additional factors influencing patient status / progress: impaired balance, pain, and hypersensitivity from mid biceps>distal UE bilaterally.      Plan    Recommend Occupational Therapy 3 times per week until therapy goals are met for the following treatments:  Adaptive Equipment, Neuro Re-Education / Balance, Self Care/Activities of Daily Living, Therapeutic Activities and Therapeutic Exercises.    DC Equipment Recommendations: Unable to determine at this time  Discharge Recommendations: Recommend home health for continued occupational therapy services     Subjective    Pleasant and cooperative     Objective       07/06/21 0952   Prior Living Situation   Prior Services Home-Independent   Housing / Facility 2 Story House   Bathroom Set up Walk In Shower;Bathtub / Shower Combination   Equipment Owned None   Lives with - Patient's Self Care Capacity Spouse;Child Less than 18 Years of Age   Comments Pt lives with wife whom is doing online school from home, as well as 6, 9, 9, 13, and 15 y/o boys. 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)   Precautions   Precautions Fall Risk;Spinal / Back Precautions ;Cervical Collar     Comments MAP >85; c-collar AAT   Pain 0 - 10 Group   Therapist Pain Assessment Nurse Notified;During Activity  (nerve pain in B arms from mid biceps>distal)   Cognition    Cognition / Consciousness WDL   Level of Consciousness Alert   Comments pleasant and cooperative   Active ROM Upper Body   Active ROM Upper Body  WDL   Dominant Hand Right;Ambidextrous   Strength Upper Body   Upper Body Strength  X   Comments grossly 4/5 Bilaterally   Sensation Upper Body   Upper Extremity Sensation  X   Comments from mid biceps>distal pt c/o hypersensitivity and nerve pain   Upper Body Muscle Tone   Upper Body Muscle Tone  WDL   Coordination Upper Body   Coordination WDL   Comments WFL for formal testing and functional observation; pt self reporting his coordination feels impaired and hands feel weak. able to manage ADL items appropriately   Balance Assessment   Sitting Balance (Static) Fair +   Sitting Balance (Dynamic) Fair   Standing Balance (Static) Fair -   Standing Balance (Dynamic) Poor +   Weight Shift Sitting Fair   Weight Shift Standing Poor   Comments w/o AD, generally unsteady   Bed Mobility    Supine to Sit Supervised   Scooting Supervised   Rolling Supervised   Comments via log roll   ADL Assessment   Eating Supervision   Grooming Supervision;Seated  (oral care)   Lower Body Dressing Minimal Assist  (don socks and undewear; assist for balance)   How much help from another person does the patient currently need...   Putting on and taking off regular lower body clothing? 3   Bathing (including washing, rinsing, and drying)? 3   Toileting, which includes using a toilet, bedpan, or  urinal? 3   Putting on and taking off regular upper body clothing? 3   Taking care of personal grooming such as brushing teeth? 3   Eating meals? 3   6 Clicks Daily Activity Score 18   Functional Mobility   Sit to Stand Minimal Assist  (CGA)   Bed, Chair, Wheelchair Transfer Minimal Assist  (CGA)   Mobility EOB>herbert>Chair   Comments no AD   ICU Target Mobility Level   ICU Mobility - Targeted Level Level 4   Patient / Family Goals   Patient / Family Goal #1 to get back to normal   Short Term Goals   Short Term Goal # 1 pt will demo toilet txf with supv   Short Term Goal # 2 pt will dress LB with supv   Short Term Goal # 3 pt will groom in stance at the sink with supv   Short Term Goal # 4 pt will dress UB including c-collar w/ supv

## 2021-07-06 NOTE — PROGRESS NOTES
Neurosurgery Progress Note    Subjective:  States continued numbness and weakness to all extremities overnight, no change    Exam:  BUE str intact, except 4/5 B .  DF/PF intact    BP  Min: 95/57  Max: 160/84  Pulse  Av  Min: 50  Max: 91  Resp  Av.6  Min: 9  Max: 29  Temp  Av.3 °C (97.4 °F)  Min: 36.1 °C (97 °F)  Max: 36.8 °C (98.3 °F)  SpO2  Av.4 %  Min: 92 %  Max: 96 %    No data recorded    Recent Labs     21   WBC 8.3   RBC 4.63*   HEMOGLOBIN 14.7   HEMATOCRIT 40.7*   MCV 87.9   MCH 31.7   MCHC 36.1*   RDW 37.9   PLATELETCT 241   MPV 9.5     Recent Labs     21   SODIUM 135   POTASSIUM 3.8   CHLORIDE 100   CO2 23   GLUCOSE 86   BUN 20   CREATININE 0.77   CALCIUM 9.1     Recent Labs     21   APTT 29.6   INR 1.02           Intake/Output                       21 0700 - 21 0659 21 07 - 21 0659     [id number] [id number] Total 1438-7057 2185-5024 Total                 Intake    Total Intake -- -- -- -- -- --       Output    Urine  450  500 950  --  -- --    Number of Times Voided 1 x 1 x 2 x -- -- --    Urine Void (mL) 450 500 950 -- -- --    Stool  --  -- --  --  -- --    Number of Times Stooled 0 x 0 x 0 x -- -- --    Total Output 450 500 950 -- -- --       Net I/O     -450 -500 -950 -- -- --            Intake/Output Summary (Last 24 hours) at 2021 0822  Last data filed at 2021 0000  Gross per 24 hour   Intake --   Output 950 ml   Net -950 ml       $ Bladder Scan Results (mL): 431    • Respiratory Therapy Consult   Continuous RT   • docusate sodium  100 mg BID   • senna-docusate  1 tablet Nightly   • senna-docusate  1 tablet Q24HRS PRN   • polyethylene glycol/lytes  1 Packet BID   • magnesium hydroxide  30 mL DAILY   • bisacodyl  10 mg Q24HRS PRN   • fleet  1 Each Once PRN   • oxyCODONE immediate-release  5 mg Q3HRS PRN    Or   • oxyCODONE immediate-release  10 mg Q3HRS PRN    Or   • HYDROmorphone  0.5-1 mg Q3HRS PRN   •  ondansetron  4 mg Q4HRS PRN   • gabapentin  200 mg TID   • metaxalone  800 mg TID   • midodrine  5 mg TID WITH MEALS   • acetaminophen  1,000 mg Q6HRS    Followed by   • [START ON 7/10/2021] acetaminophen  1,000 mg Q6HRS PRN       Assessment and Plan:  Hospital day #2 C4-5 stenosis CCS    Keep MAP>85  Dr Hall to cm powers

## 2021-07-06 NOTE — ASSESSMENT & PLAN NOTE
Date of admission: 7/4/2021  Date: 7/6 Transfer orders from SICU  Date: PM&R consult on admission.  Cleared for discharge: Yes - Date: 7/9  Discharge delayed: No    Discharge date: TBD

## 2021-07-06 NOTE — CARE PLAN
The patient is Stable - Low risk of patient condition declining or worsening    Shift Goals  Clinical Goals: Manage pain, ambulate  Patient Goals: Reduce neurological pain in BUE  Family Goals: No family present      Problem: Pain - Standard  Goal: Alleviation of pain or a reduction in pain to the patient’s comfort goal  Outcome: Progressing     Problem: Knowledge Deficit - Standard  Goal: Patient and family/care givers will demonstrate understanding of plan of care, disease process/condition, diagnostic tests and medications  Outcome: Progressing     Problem: Skin Integrity  Goal: Skin integrity is maintained or improved  Outcome: Progressing     Problem: Fall Risk  Goal: Patient will remain free from falls  Outcome: Progressing

## 2021-07-06 NOTE — THERAPY
Physical Therapy   Initial Evaluation     Patient Name: Osmani Fitzpatrick  Age:  51 y.o., Sex:  male  Medical Record #: [id number]  Today's Date: 7/6/2021     Precautions: Fall Risk, Spinal / Back Precautions , Cervical Collar    Comments: MAP>85, c-collar AAT    Assessment  Patient is 51 y.o. male admitted after wakeboarding accident. PMH includes C5-7 fusion 5-6 weeks ago by Dr Renner. Post accident, pt suffered from transient quadriparesis. Once pt arrived at the hospital his symptoms were consistent with central cord injury, B UE weakness and sensory changes. At time of initial PT eval, pt reporting B  weakness and nerve pain in B UE. No strength or sensory deficits noted in B LE but pt reported increased difficulty in L LE with coordination testing. Dr. Renner in room, requesting therapy evals pre-op w/ plan to do surgery on Thurs 7/8. Pt completed bed mobility via log roll with SPV, STS with min assist, and ambulated ~100ft with min assist and no AD. PT will cont while pt is in acute care setting to address deficits including balance, generalized strengthening, and post op education. .     Plan    Recommend Physical Therapy 4 times per week until therapy goals are met for the following treatments:  Gait Training, Self Care/Home Evaluation, Stair Training, Therapeutic Activities and Therapeutic Exercises    DC Equipment Recommendations: None  Discharge Recommendations: Recommend home health for continued physical therapy services          07/06/21 0948   Vitals   O2 (LPM) 0.5   O2 Delivery Device Silicone Nasal Cannula   Prior Living Situation   Prior Services Home-Independent   Housing / Facility 2 Story House   Steps Into Home 14   Steps In Home 14   Elevator No   Bathroom Set up Walk In Shower;Bathtub / Shower Combination   Equipment Owned None   Lives with - Patient's Self Care Capacity Spouse;Child Less than 18 Years of Age   Comments pt lives with wife who is doing online nursing school from home. They  have 5 boys ages 6,9, 9, 13, 15   Prior Level of Functional Mobility   Bed Mobility Independent   Transfer Status Independent   Ambulation Independent   Distance Ambulation (Feet)   (community)   Assistive Devices Used None   Stairs Independent   Comments active, climbs trees for a living   Cognition    Level of Consciousness Alert   Comments cooperative   Coordination Lower Body    Coordination Lower Body  X   Toe Tapping Left Impaired   Heel To Shin Left Impaired   Comments slight impairment   Gait Analysis   Gait Level Of Assist Minimal Assist   Assistive Device None   Distance (Feet) 100   # of Times Distance was Traveled 1   Deviation Decreased Base Of Support   # of Stairs Climbed 0   Weight Bearing Status no restrictions   Comments several staggered steps, min assist to maintain SHAWNEE   Bed Mobility    Supine to Sit Supervised   Sit to Supine   (in chair)   Scooting Supervised   Rolling Supervised   Comments log roll   Functional Mobility   Sit to Stand Minimal Assist   Bed, Chair, Wheelchair Transfer Minimal Assist   Transfer Method Stand Step   Mobility in hallway   ICU Target Mobility Level   ICU Mobility - Targeted Level Level 4   Short Term Goals    Short Term Goal # 1 pt will be able to verbalize c-spine precautions in 6tx in order to improve prognosis   Short Term Goal # 2 pt will be able to complete functional transfers with no AD and SPV in 6tx in order to return to prior level   Short Term Goal # 3 pt will be able to ambulate 300ft with no AD and SPV in 6tx in order to return home   Short Term Goal # 4 pt will be able to negotiate FOS with SPV in 6tx in order to return home   Anticipated Discharge Equipment and Recommendations   DC Equipment Recommendations None   Discharge Recommendations Recommend home health for continued physical therapy services

## 2021-07-07 ENCOUNTER — ANESTHESIA EVENT (OUTPATIENT)
Dept: SURGERY | Facility: MEDICAL CENTER | Age: 51
DRG: 029 | End: 2021-07-07
Payer: COMMERCIAL

## 2021-07-07 LAB
ANION GAP SERPL CALC-SCNC: 9 MMOL/L (ref 7–16)
BASOPHILS # BLD AUTO: 1 % (ref 0–1.8)
BASOPHILS # BLD: 0.08 K/UL (ref 0–0.12)
BUN SERPL-MCNC: 13 MG/DL (ref 8–22)
CALCIUM SERPL-MCNC: 8.8 MG/DL (ref 8.5–10.5)
CFT BLD TEG: 5.2 MIN (ref 5–10)
CHLORIDE SERPL-SCNC: 104 MMOL/L (ref 96–112)
CLOT ANGLE BLD TEG: 61.3 DEGREES (ref 53–72)
CLOT LYSIS 30M P MA LENFR BLD TEG: 0 % (ref 0–8)
CO2 SERPL-SCNC: 26 MMOL/L (ref 20–33)
CREAT SERPL-MCNC: 0.74 MG/DL (ref 0.5–1.4)
CT.EXTRINSIC BLD ROTEM: 2.2 MIN (ref 1–3)
EOSINOPHIL # BLD AUTO: 0.38 K/UL (ref 0–0.51)
EOSINOPHIL NFR BLD: 4.9 % (ref 0–6.9)
ERYTHROCYTE [DISTWIDTH] IN BLOOD BY AUTOMATED COUNT: 38.8 FL (ref 35.9–50)
GLUCOSE SERPL-MCNC: 106 MG/DL (ref 65–99)
HCT VFR BLD AUTO: 42.5 % (ref 42–52)
HGB BLD-MCNC: 15 G/DL (ref 14–18)
IMM GRANULOCYTES # BLD AUTO: 0.03 K/UL (ref 0–0.11)
IMM GRANULOCYTES NFR BLD AUTO: 0.4 % (ref 0–0.9)
LYMPHOCYTES # BLD AUTO: 2.53 K/UL (ref 1–4.8)
LYMPHOCYTES NFR BLD: 32.3 % (ref 22–41)
MCF BLD TEG: 57.1 MM (ref 50–70)
MCH RBC QN AUTO: 31.4 PG (ref 27–33)
MCHC RBC AUTO-ENTMCNC: 35.3 G/DL (ref 33.7–35.3)
MCV RBC AUTO: 89.1 FL (ref 81.4–97.8)
MONOCYTES # BLD AUTO: 0.52 K/UL (ref 0–0.85)
MONOCYTES NFR BLD AUTO: 6.6 % (ref 0–13.4)
NEUTROPHILS # BLD AUTO: 4.29 K/UL (ref 1.82–7.42)
NEUTROPHILS NFR BLD: 54.8 % (ref 44–72)
NRBC # BLD AUTO: 0 K/UL
NRBC BLD-RTO: 0 /100 WBC
PA AA BLD-ACNC: 0 %
PA ADP BLD-ACNC: 13.4 %
PLATELET # BLD AUTO: 250 K/UL (ref 164–446)
PMV BLD AUTO: 9.1 FL (ref 9–12.9)
POTASSIUM SERPL-SCNC: 4.3 MMOL/L (ref 3.6–5.5)
RBC # BLD AUTO: 4.77 M/UL (ref 4.7–6.1)
SODIUM SERPL-SCNC: 139 MMOL/L (ref 135–145)
TEG ALGORITHM TGALG: NORMAL
WBC # BLD AUTO: 7.8 K/UL (ref 4.8–10.8)

## 2021-07-07 PROCEDURE — 770001 HCHG ROOM/CARE - MED/SURG/GYN PRIV*

## 2021-07-07 PROCEDURE — 80048 BASIC METABOLIC PNL TOTAL CA: CPT

## 2021-07-07 PROCEDURE — 85576 BLOOD PLATELET AGGREGATION: CPT

## 2021-07-07 PROCEDURE — A9270 NON-COVERED ITEM OR SERVICE: HCPCS | Performed by: SURGERY

## 2021-07-07 PROCEDURE — 87426 SARSCOV CORONAVIRUS AG IA: CPT

## 2021-07-07 PROCEDURE — A9270 NON-COVERED ITEM OR SERVICE: HCPCS | Performed by: NURSE PRACTITIONER

## 2021-07-07 PROCEDURE — 700102 HCHG RX REV CODE 250 W/ 637 OVERRIDE(OP): Performed by: NURSE PRACTITIONER

## 2021-07-07 PROCEDURE — 99232 SBSQ HOSP IP/OBS MODERATE 35: CPT | Performed by: SURGERY

## 2021-07-07 PROCEDURE — A9270 NON-COVERED ITEM OR SERVICE: HCPCS | Performed by: NEUROLOGICAL SURGERY

## 2021-07-07 PROCEDURE — 85384 FIBRINOGEN ACTIVITY: CPT

## 2021-07-07 PROCEDURE — 85347 COAGULATION TIME ACTIVATED: CPT

## 2021-07-07 PROCEDURE — 85025 COMPLETE CBC W/AUTO DIFF WBC: CPT

## 2021-07-07 PROCEDURE — 700102 HCHG RX REV CODE 250 W/ 637 OVERRIDE(OP): Performed by: SURGERY

## 2021-07-07 PROCEDURE — 700102 HCHG RX REV CODE 250 W/ 637 OVERRIDE(OP): Performed by: NEUROLOGICAL SURGERY

## 2021-07-07 PROCEDURE — A9270 NON-COVERED ITEM OR SERVICE: HCPCS | Performed by: PHYSICAL MEDICINE & REHABILITATION

## 2021-07-07 PROCEDURE — 700102 HCHG RX REV CODE 250 W/ 637 OVERRIDE(OP): Performed by: PHYSICAL MEDICINE & REHABILITATION

## 2021-07-07 RX ADMIN — GABAPENTIN 400 MG: 400 CAPSULE ORAL at 11:29

## 2021-07-07 RX ADMIN — ACETAMINOPHEN 1000 MG: 500 TABLET ORAL at 11:29

## 2021-07-07 RX ADMIN — ACETAMINOPHEN 1000 MG: 500 TABLET ORAL at 17:03

## 2021-07-07 RX ADMIN — DOCUSATE SODIUM 100 MG: 100 CAPSULE ORAL at 17:03

## 2021-07-07 RX ADMIN — DOCUSATE SODIUM 50 MG AND SENNOSIDES 8.6 MG 1 TABLET: 8.6; 5 TABLET, FILM COATED ORAL at 20:22

## 2021-07-07 RX ADMIN — ACETAMINOPHEN 1000 MG: 500 TABLET ORAL at 05:22

## 2021-07-07 RX ADMIN — METAXALONE 800 MG: 800 TABLET ORAL at 05:21

## 2021-07-07 RX ADMIN — METAXALONE 800 MG: 800 TABLET ORAL at 17:03

## 2021-07-07 RX ADMIN — MIDODRINE HYDROCHLORIDE 10 MG: 5 TABLET ORAL at 14:06

## 2021-07-07 RX ADMIN — GABAPENTIN 400 MG: 400 CAPSULE ORAL at 05:21

## 2021-07-07 RX ADMIN — OXYCODONE HYDROCHLORIDE 10 MG: 10 TABLET ORAL at 18:45

## 2021-07-07 RX ADMIN — MAGNESIUM HYDROXIDE 30 ML: 400 SUSPENSION ORAL at 05:22

## 2021-07-07 RX ADMIN — DOCUSATE SODIUM 100 MG: 100 CAPSULE ORAL at 05:21

## 2021-07-07 RX ADMIN — POLYETHYLENE GLYCOL 3350 1 PACKET: 17 POWDER, FOR SOLUTION ORAL at 05:22

## 2021-07-07 RX ADMIN — POLYETHYLENE GLYCOL 3350 1 PACKET: 17 POWDER, FOR SOLUTION ORAL at 17:03

## 2021-07-07 RX ADMIN — METAXALONE 800 MG: 800 TABLET ORAL at 11:29

## 2021-07-07 RX ADMIN — GABAPENTIN 400 MG: 400 CAPSULE ORAL at 17:03

## 2021-07-07 RX ADMIN — OXYCODONE 5 MG: 5 TABLET ORAL at 08:00

## 2021-07-07 RX ADMIN — MIDODRINE HYDROCHLORIDE 10 MG: 5 TABLET ORAL at 22:04

## 2021-07-07 RX ADMIN — MIDODRINE HYDROCHLORIDE 10 MG: 5 TABLET ORAL at 05:21

## 2021-07-07 ASSESSMENT — PAIN DESCRIPTION - PAIN TYPE: TYPE: ACUTE PAIN

## 2021-07-07 ASSESSMENT — ENCOUNTER SYMPTOMS
MYALGIAS: 1
EYES NEGATIVE: 1
PSYCHIATRIC NEGATIVE: 1
RESPIRATORY NEGATIVE: 1
SENSORY CHANGE: 1
BACK PAIN: 1
ROS GI COMMENTS: LAST BM 7/7
NECK PAIN: 1
GASTROINTESTINAL NEGATIVE: 1

## 2021-07-07 NOTE — CARE PLAN
The patient is Watcher - Medium risk of patient condition declining or worsening    Shift Goals  Clinical Goals: pt's pain will remained controlled, MAP will stay above 85  Patient Goals: Comfort  Family Goals: No family present    Progress made toward(s) clinical / shift goals:  Pt's pain has remained controlled and MAP has stayed above 85    Patient is not progressing towards the following goals:      Problem: Pain - Standard  Goal: Alleviation of pain or a reduction in pain to the patient’s comfort goal  Outcome: Progressing     Problem: Knowledge Deficit - Standard  Goal: Patient and family/care givers will demonstrate understanding of plan of care, disease process/condition, diagnostic tests and medications  Outcome: Progressing     Problem: Skin Integrity  Goal: Skin integrity is maintained or improved  Outcome: Progressing     Problem: Fall Risk  Goal: Patient will remain free from falls  Outcome: Progressing

## 2021-07-07 NOTE — PROGRESS NOTES
Neurosurgery Progress Note    Subjective:  No acute events over night  Sitting up and mobilizing  Shooting pain down both arms    Exam:  A/Ox4  BUE 5/5 with pain    BP  Min: 119/78  Max: 156/87  Pulse  Av.6  Min: 50  Max: 80  Resp  Av.6  Min: 9  Max: 18  Temp  Av.4 °C (97.5 °F)  Min: 35.8 °C (96.5 °F)  Max: 36.7 °C (98 °F)  SpO2  Av.1 %  Min: 91 %  Max: 96 %    No data recorded    Recent Labs     21  0400   WBC 8.3 7.8   RBC 4.63* 4.77   HEMOGLOBIN 14.7 15.0   HEMATOCRIT 40.7* 42.5   MCV 87.9 89.1   MCH 31.7 31.4   MCHC 36.1* 35.3   RDW 37.9 38.8   PLATELETCT 241 250   MPV 9.5 9.1     Recent Labs     21  0400   SODIUM 135 139   POTASSIUM 3.8 4.3   CHLORIDE 100 104   CO2 23 26   GLUCOSE 86 106*   BUN 20 13   CREATININE 0.77 0.74   CALCIUM 9.1 8.8     Recent Labs     21   APTT 29.6   INR 1.02           Intake/Output                       21 - 21 - 2159      Total  Total                 Intake    P.O.  --  500 500  --  -- --    P.O. -- 500 500 -- -- --    Total Intake -- 500 500 -- -- --       Output    Urine  900  400 1300  300  -- 300    Number of Times Voided 3 x 1 x 4 x 1 x -- 1 x    Urine Void (mL)  300 -- 300    Stool  --  -- --  --  -- --    Number of Times Stooled 0 x -- 0 x -- -- --    Total Output  300 -- 300       Net I/O     -900 100 -800 -300 -- -300            Intake/Output Summary (Last 24 hours) at 2021 0904  Last data filed at 2021 0800  Gross per 24 hour   Intake 500 ml   Output 1600 ml   Net -1100 ml            • gabapentin  400 mg TID   • midodrine  10 mg Q8HRS   • Respiratory Therapy Consult   Continuous RT   • docusate sodium  100 mg BID   • senna-docusate  1 tablet Nightly   • senna-docusate  1 tablet Q24HRS PRN   • polyethylene glycol/lytes  1 Packet BID   • magnesium hydroxide  30 mL DAILY   • bisacodyl  10  mg Q24HRS PRN   • fleet  1 Each Once PRN   • oxyCODONE immediate-release  5 mg Q3HRS PRN    Or   • oxyCODONE immediate-release  10 mg Q3HRS PRN    Or   • HYDROmorphone  0.5-1 mg Q3HRS PRN   • ondansetron  4 mg Q4HRS PRN   • metaxalone  800 mg TID   • acetaminophen  1,000 mg Q6HRS    Followed by   • [START ON 7/10/2021] acetaminophen  1,000 mg Q6HRS PRN       Assessment and Plan:  Hospital day #3  POD #n/a  Prophylactic anticoagulation: no         Start date/time: n/a    OR with Dr. Renner tmrw  NPO at midnight  Coag from 7/4 normal but reports chronic Ibuprofen use  Will obtain TEG  CBC/CMP unremarkable  Maintain MAP >85

## 2021-07-07 NOTE — DISCHARGE PLANNING
Would appreciate updated PT/OT evals s/p possible surgery tomorrow once appropriate.     0803-Per PAR, [facility]-plan is non-contracted and OON. No OON benefit.  Recommend a referral to [facility] for post acute services.  [facility] will no longer follow.  Please reach out to myself @ [phone] with any questions.

## 2021-07-07 NOTE — CARE PLAN
The patient is Stable - Low risk of patient condition declining or worsening    Shift Goals  Clinical Goals: Pain control, ambulation  Patient Goals: Comfort  Family Goals: No family present    Progress made toward(s) clinical / shift goals:  up ambulating around unit, working with PT/OT improvement in ADLs, using IS. Gabapentin increased by MD for better pain control.    Patient is not progressing towards the following goals:    N/a

## 2021-07-07 NOTE — PROGRESS NOTES
Trauma / Surgical Daily Progress Note    Date of Service  7/7/2021    Chief Complaint  51 y.o. male admitted 7/4/2021 with central cord syndrome and paresthesia.    Interval Events  Patient feeling overall well.   Plan for NPO at midnight and OR in AM  Transfer to ortho/spine latif    -Therapies post op  -Patient hopeful for DC home with therapies.       Review of Systems  Review of Systems   HENT: Negative.    Eyes: Negative.    Respiratory: Negative.    Gastrointestinal: Negative.         Last BM 7/7   Genitourinary:        Voiding   Musculoskeletal: Positive for back pain, myalgias and neck pain.   Neurological: Positive for sensory change.   Psychiatric/Behavioral: Negative.    All other systems reviewed and are negative.       Vital Signs  Temp:  [35.8 °C (96.5 °F)-36.7 °C (98 °F)] 36.7 °C (98 °F)  Pulse:  [50-80] 74  Resp:  [9-18] 15  BP: (119-156)/(77-91) 136/77  SpO2:  [91 %-96 %] 95 %    Physical Exam  Physical Exam  Vitals and nursing note reviewed.   Constitutional:       General: He is not in acute distress.     Appearance: Normal appearance.      Interventions: Cervical collar in place.   HENT:      Right Ear: External ear normal.      Left Ear: External ear normal.      Nose: Nose normal.      Mouth/Throat:      Mouth: Mucous membranes are moist.      Pharynx: Oropharynx is clear.   Eyes:      General:         Right eye: No discharge.         Left eye: No discharge.      Conjunctiva/sclera: Conjunctivae normal.      Pupils: Pupils are equal, round, and reactive to light.   Neck:      Comments: Hard collar in place.  Cardiovascular:      Rate and Rhythm: Normal rate and regular rhythm.      Pulses: Normal pulses.   Pulmonary:      Effort: Pulmonary effort is normal. No respiratory distress.      Breath sounds: Normal breath sounds.   Abdominal:      General: There is no distension.      Palpations: Abdomen is soft.      Tenderness: There is no abdominal tenderness.   Musculoskeletal:         General: No  swelling, tenderness, deformity or signs of injury.      Cervical back: Spinous process tenderness and muscular tenderness present.   Skin:     General: Skin is warm and dry.      Capillary Refill: Capillary refill takes less than 2 seconds.   Neurological:      Mental Status: He is alert and oriented to person, place, and time.      GCS: GCS eye subscore is 4. GCS verbal subscore is 5. GCS motor subscore is 6.      Motor: Weakness present.      Comments: Paraesthesia and weakness to bilateral upper extremities.  Grasps equal.  Moving all extremities X 4.   Psychiatric:         Mood and Affect: Mood normal.         Behavior: Behavior normal.         Thought Content: Thought content normal.         Laboratory  Recent Results (from the past 24 hour(s))   CBC WITH DIFFERENTIAL    Collection Time: 07/07/21  4:00 AM   Result Value Ref Range    WBC 7.8 4.8 - 10.8 K/uL    RBC 4.77 4.70 - 6.10 M/uL    Hemoglobin 15.0 14.0 - 18.0 g/dL    Hematocrit 42.5 42.0 - 52.0 %    MCV 89.1 81.4 - 97.8 fL    MCH 31.4 27.0 - 33.0 pg    MCHC 35.3 33.7 - 35.3 g/dL    RDW 38.8 35.9 - 50.0 fL    Platelet Count 250 164 - 446 K/uL    MPV 9.1 9.0 - 12.9 fL    Neutrophils-Polys 54.80 44.00 - 72.00 %    Lymphocytes 32.30 22.00 - 41.00 %    Monocytes 6.60 0.00 - 13.40 %    Eosinophils 4.90 0.00 - 6.90 %    Basophils 1.00 0.00 - 1.80 %    Immature Granulocytes 0.40 0.00 - 0.90 %    Nucleated RBC 0.00 /100 WBC    Neutrophils (Absolute) 4.29 1.82 - 7.42 K/uL    Lymphs (Absolute) 2.53 1.00 - 4.80 K/uL    Monos (Absolute) 0.52 0.00 - 0.85 K/uL    Eos (Absolute) 0.38 0.00 - 0.51 K/uL    Baso (Absolute) 0.08 0.00 - 0.12 K/uL    Immature Granulocytes (abs) 0.03 0.00 - 0.11 K/uL    NRBC (Absolute) 0.00 K/uL   Basic Metabolic Panel    Collection Time: 07/07/21  4:00 AM   Result Value Ref Range    Sodium 139 135 - 145 mmol/L    Potassium 4.3 3.6 - 5.5 mmol/L    Chloride 104 96 - 112 mmol/L    Co2 26 20 - 33 mmol/L    Glucose 106 (H) 65 - 99 mg/dL    Bun 13 8  - 22 mg/dL    Creatinine 0.74 0.50 - 1.40 mg/dL    Calcium 8.8 8.5 - 10.5 mg/dL    Anion Gap 9.0 7.0 - 16.0   ESTIMATED GFR    Collection Time: 07/07/21  4:00 AM   Result Value Ref Range    GFR If African American >60 >60 mL/min/1.73 m 2    GFR If Non African American >60 >60 mL/min/1.73 m 2       Fluids    Intake/Output Summary (Last 24 hours) at 7/7/2021 0954  Last data filed at 7/7/2021 0800  Gross per 24 hour   Intake 500 ml   Output 1600 ml   Net -1100 ml       Core Measures & Quality Metrics  Labs reviewed, Medications reviewed and Radiology images reviewed        DVT Prophylaxis: Contraindicated - High bleeding risk  DVT prophylaxis - mechanical: SCDs  Ulcer prophylaxis: Not indicated    Assessed for rehab: Patient was assess for and/or received rehabilitation services during this hospitalization    RAP Score Total: 2    ETOH Screening  CAGE Score: 0  Assessment complete date: 7/5/2021 (neg CAGE, neg LIDIA)        Assessment/Plan  Central cord syndrome (HCC)- (present on admission)  Assessment & Plan  Recent C5-C7 fusion about 5-6 weeks ago.  CT imaging with no cervical spine fracture or subluxation. Prior cervical discectomy and anterior fusion from C5 to C7.  MR imaging of the cervical spine demonstrated prior anterior fusion at C5-C7, multilevel multifactorial degenerative changes, severe central canal and bilateral neural foraminal narrowing at C4-C5.  Midodrine.   7/8 Plan for surgery.  Roberto Hall MD. Neurosurgeon. [facility].    Paresthesia- (present on admission)  Assessment & Plan  Post injury paresthesia in bilateral upper extremities.  Roberto Hall MD. Neurosurgeon. [facility].    Discharge planning issues  Assessment & Plan  Date of admission: 7/4/2021  Date: 7/6 Transfer orders from SICU  Date: PM&R consult on admission.  Cleared for discharge: No  Discharge delayed: No    Discharge date: TBD        Contraindication to deep vein thrombosis (DVT) prophylaxis-  (present on admission)  Assessment & Plan  Prophylactic anticoagulation for thrombotic prevention initially contraindicated secondary to elevated bleeding risk.  7/6 Trauma screening bilateral lower extremity venous duplex negative for above knee DVT.    Trauma- (present on admission)  Assessment & Plan  Wakeboarding injury.  Transient upper extremity neurologic symptoms.  Trauma Red Activation.  Christiana Mclean MD. Trauma Surgery.         Discussed patient condition with RN, Patient and trauma surgery.  Dr. Rey.

## 2021-07-07 NOTE — CARE PLAN
Problem: Pain - Standard  Goal: Alleviation of pain or a reduction in pain to the patient’s comfort goal  Outcome: Progressing     Problem: Knowledge Deficit - Standard  Goal: Patient and family/care givers will demonstrate understanding of plan of care, disease process/condition, diagnostic tests and medications  Outcome: Progressing     Problem: Knowledge Deficit - Standard  Goal: Patient and family/care givers will demonstrate understanding of plan of care, disease process/condition, diagnostic tests and medications  Outcome: Progressing     The patient is Stable - Low risk of patient condition declining or worsening    Shift Goals  Clinical Goals: pt's pain will remained controlled, MAP will stay above 85  Patient Goals: Comfort  Family Goals: No family present    Progress made toward(s) clinical / shift goals: MAP >85.     Patient is not progressing towards the following goals: Pain

## 2021-07-07 NOTE — PROGRESS NOTES
Received bedside report from SOM Johnson, pt care assumed. No signs of acute distress noted at this time. Bed in lowest position. Pt educated on fall risk and use of call light.

## 2021-07-08 ENCOUNTER — APPOINTMENT (OUTPATIENT)
Dept: RADIOLOGY | Facility: MEDICAL CENTER | Age: 51
DRG: 029 | End: 2021-07-08
Attending: NEUROLOGICAL SURGERY
Payer: COMMERCIAL

## 2021-07-08 ENCOUNTER — ANESTHESIA (OUTPATIENT)
Dept: SURGERY | Facility: MEDICAL CENTER | Age: 51
DRG: 029 | End: 2021-07-08
Payer: COMMERCIAL

## 2021-07-08 LAB
SARS-COV+SARS-COV-2 AG RESP QL IA.RAPID: NOTDETECTED
SPECIMEN SOURCE: NORMAL

## 2021-07-08 PROCEDURE — 0RG10A0 FUSION OF CERVICAL VERTEBRAL JOINT WITH INTERBODY FUSION DEVICE, ANTERIOR APPROACH, ANTERIOR COLUMN, OPEN APPROACH: ICD-10-PCS | Performed by: NEUROLOGICAL SURGERY

## 2021-07-08 PROCEDURE — A9270 NON-COVERED ITEM OR SERVICE: HCPCS | Performed by: NURSE PRACTITIONER

## 2021-07-08 PROCEDURE — 72040 X-RAY EXAM NECK SPINE 2-3 VW: CPT

## 2021-07-08 PROCEDURE — 700105 HCHG RX REV CODE 258: Performed by: ANESTHESIOLOGY

## 2021-07-08 PROCEDURE — 99233 SBSQ HOSP IP/OBS HIGH 50: CPT | Performed by: PHYSICAL MEDICINE & REHABILITATION

## 2021-07-08 PROCEDURE — 700105 HCHG RX REV CODE 258: Performed by: NURSE PRACTITIONER

## 2021-07-08 PROCEDURE — 700102 HCHG RX REV CODE 250 W/ 637 OVERRIDE(OP): Performed by: SURGERY

## 2021-07-08 PROCEDURE — 700106 HCHG RX REV CODE 271: Performed by: NEUROLOGICAL SURGERY

## 2021-07-08 PROCEDURE — 99233 SBSQ HOSP IP/OBS HIGH 50: CPT | Performed by: SURGERY

## 2021-07-08 PROCEDURE — C1713 ANCHOR/SCREW BN/BN,TIS/BN: HCPCS | Performed by: NEUROLOGICAL SURGERY

## 2021-07-08 PROCEDURE — 700111 HCHG RX REV CODE 636 W/ 250 OVERRIDE (IP): Performed by: ANESTHESIOLOGY

## 2021-07-08 PROCEDURE — 95925 SOMATOSENSORY TESTING: CPT | Performed by: NEUROLOGICAL SURGERY

## 2021-07-08 PROCEDURE — 700102 HCHG RX REV CODE 250 W/ 637 OVERRIDE(OP): Performed by: NURSE PRACTITIONER

## 2021-07-08 PROCEDURE — 110454 HCHG SHELL REV 250: Performed by: NEUROLOGICAL SURGERY

## 2021-07-08 PROCEDURE — A9270 NON-COVERED ITEM OR SERVICE: HCPCS | Performed by: PHYSICAL MEDICINE & REHABILITATION

## 2021-07-08 PROCEDURE — 95868 NDL EMG CRANIAL NRV MUSC BI: CPT | Performed by: NEUROLOGICAL SURGERY

## 2021-07-08 PROCEDURE — A9270 NON-COVERED ITEM OR SERVICE: HCPCS | Performed by: SURGERY

## 2021-07-08 PROCEDURE — 95937 NEUROMUSCULAR JUNCTION TEST: CPT | Performed by: NEUROLOGICAL SURGERY

## 2021-07-08 PROCEDURE — A9270 NON-COVERED ITEM OR SERVICE: HCPCS | Performed by: NEUROLOGICAL SURGERY

## 2021-07-08 PROCEDURE — 160048 HCHG OR STATISTICAL LEVEL 1-5: Performed by: NEUROLOGICAL SURGERY

## 2021-07-08 PROCEDURE — 95865 NEEDLE EMG LARYNX: CPT | Performed by: NEUROLOGICAL SURGERY

## 2021-07-08 PROCEDURE — 500367 HCHG DRAIN KIT, HEMOVAC: Performed by: NEUROLOGICAL SURGERY

## 2021-07-08 PROCEDURE — 700102 HCHG RX REV CODE 250 W/ 637 OVERRIDE(OP): Performed by: ANESTHESIOLOGY

## 2021-07-08 PROCEDURE — 95861 NEEDLE EMG 2 EXTREMITIES: CPT | Performed by: NEUROLOGICAL SURGERY

## 2021-07-08 PROCEDURE — 700101 HCHG RX REV CODE 250: Performed by: ANESTHESIOLOGY

## 2021-07-08 PROCEDURE — 160036 HCHG PACU - EA ADDL 30 MINS PHASE I: Performed by: NEUROLOGICAL SURGERY

## 2021-07-08 PROCEDURE — 501838 HCHG SUTURE GENERAL: Performed by: NEUROLOGICAL SURGERY

## 2021-07-08 PROCEDURE — 700101 HCHG RX REV CODE 250: Performed by: NEUROLOGICAL SURGERY

## 2021-07-08 PROCEDURE — 700111 HCHG RX REV CODE 636 W/ 250 OVERRIDE (IP): Performed by: NEUROLOGICAL SURGERY

## 2021-07-08 PROCEDURE — 95940 IONM IN OPERATNG ROOM 15 MIN: CPT | Performed by: NEUROLOGICAL SURGERY

## 2021-07-08 PROCEDURE — 0RB30ZZ EXCISION OF CERVICAL VERTEBRAL DISC, OPEN APPROACH: ICD-10-PCS | Performed by: NEUROLOGICAL SURGERY

## 2021-07-08 PROCEDURE — 700102 HCHG RX REV CODE 250 W/ 637 OVERRIDE(OP): Performed by: PHYSICAL MEDICINE & REHABILITATION

## 2021-07-08 PROCEDURE — 01N10ZZ RELEASE CERVICAL NERVE, OPEN APPROACH: ICD-10-PCS | Performed by: NEUROLOGICAL SURGERY

## 2021-07-08 PROCEDURE — 500864 HCHG NEEDLE, SPINAL 18G: Performed by: NEUROLOGICAL SURGERY

## 2021-07-08 PROCEDURE — 160029 HCHG SURGERY MINUTES - 1ST 30 MINS LEVEL 4: Performed by: NEUROLOGICAL SURGERY

## 2021-07-08 PROCEDURE — 160002 HCHG RECOVERY MINUTES (STAT): Performed by: NEUROLOGICAL SURGERY

## 2021-07-08 PROCEDURE — 160035 HCHG PACU - 1ST 60 MINS PHASE I: Performed by: NEUROLOGICAL SURGERY

## 2021-07-08 PROCEDURE — 160041 HCHG SURGERY MINUTES - EA ADDL 1 MIN LEVEL 4: Performed by: NEUROLOGICAL SURGERY

## 2021-07-08 PROCEDURE — 95939 C MOTOR EVOKED UPR&LWR LIMBS: CPT | Performed by: NEUROLOGICAL SURGERY

## 2021-07-08 PROCEDURE — 160009 HCHG ANES TIME/MIN: Performed by: NEUROLOGICAL SURGERY

## 2021-07-08 PROCEDURE — 502000 HCHG MISC OR IMPLANTS RC 0278: Performed by: NEUROLOGICAL SURGERY

## 2021-07-08 PROCEDURE — 700102 HCHG RX REV CODE 250 W/ 637 OVERRIDE(OP): Performed by: NEUROLOGICAL SURGERY

## 2021-07-08 PROCEDURE — A9270 NON-COVERED ITEM OR SERVICE: HCPCS | Performed by: ANESTHESIOLOGY

## 2021-07-08 PROCEDURE — 770001 HCHG ROOM/CARE - MED/SURG/GYN PRIV*

## 2021-07-08 RX ORDER — SODIUM CHLORIDE, SODIUM LACTATE, POTASSIUM CHLORIDE, AND CALCIUM CHLORIDE .6; .31; .03; .02 G/100ML; G/100ML; G/100ML; G/100ML
IRRIGANT IRRIGATION
Status: DISCONTINUED | OUTPATIENT
Start: 2021-07-08 | End: 2021-07-08 | Stop reason: HOSPADM

## 2021-07-08 RX ORDER — DOCUSATE SODIUM 100 MG/1
100 CAPSULE, LIQUID FILLED ORAL 2 TIMES DAILY
Status: DISCONTINUED | OUTPATIENT
Start: 2021-07-08 | End: 2021-07-09 | Stop reason: HOSPADM

## 2021-07-08 RX ORDER — SODIUM CHLORIDE, SODIUM LACTATE, POTASSIUM CHLORIDE, CALCIUM CHLORIDE 600; 310; 30; 20 MG/100ML; MG/100ML; MG/100ML; MG/100ML
INJECTION, SOLUTION INTRAVENOUS CONTINUOUS
Status: DISCONTINUED | OUTPATIENT
Start: 2021-07-08 | End: 2021-07-08 | Stop reason: HOSPADM

## 2021-07-08 RX ORDER — GABAPENTIN 300 MG/1
600 CAPSULE ORAL 3 TIMES DAILY
Status: DISCONTINUED | OUTPATIENT
Start: 2021-07-08 | End: 2021-07-09 | Stop reason: HOSPADM

## 2021-07-08 RX ORDER — DEXAMETHASONE SODIUM PHOSPHATE 4 MG/ML
INJECTION, SOLUTION INTRA-ARTICULAR; INTRALESIONAL; INTRAMUSCULAR; INTRAVENOUS; SOFT TISSUE PRN
Status: DISCONTINUED | OUTPATIENT
Start: 2021-07-08 | End: 2021-07-08 | Stop reason: SURG

## 2021-07-08 RX ORDER — SODIUM CHLORIDE, SODIUM LACTATE, POTASSIUM CHLORIDE, CALCIUM CHLORIDE 600; 310; 30; 20 MG/100ML; MG/100ML; MG/100ML; MG/100ML
INJECTION, SOLUTION INTRAVENOUS CONTINUOUS
Status: DISCONTINUED | OUTPATIENT
Start: 2021-07-08 | End: 2021-07-09

## 2021-07-08 RX ORDER — DEXAMETHASONE SODIUM PHOSPHATE 4 MG/ML
4 INJECTION, SOLUTION INTRA-ARTICULAR; INTRALESIONAL; INTRAMUSCULAR; INTRAVENOUS; SOFT TISSUE EVERY 6 HOURS
Status: DISCONTINUED | OUTPATIENT
Start: 2021-07-09 | End: 2021-07-09 | Stop reason: HOSPADM

## 2021-07-08 RX ORDER — OXYCODONE HCL 5 MG/5 ML
5 SOLUTION, ORAL ORAL
Status: COMPLETED | OUTPATIENT
Start: 2021-07-08 | End: 2021-07-08

## 2021-07-08 RX ORDER — BISACODYL 10 MG
10 SUPPOSITORY, RECTAL RECTAL
Status: DISCONTINUED | OUTPATIENT
Start: 2021-07-08 | End: 2021-07-09 | Stop reason: HOSPADM

## 2021-07-08 RX ORDER — DIPHENHYDRAMINE HYDROCHLORIDE 50 MG/ML
12.5 INJECTION INTRAMUSCULAR; INTRAVENOUS
Status: DISCONTINUED | OUTPATIENT
Start: 2021-07-08 | End: 2021-07-08 | Stop reason: HOSPADM

## 2021-07-08 RX ORDER — POLYETHYLENE GLYCOL 3350 17 G/17G
1 POWDER, FOR SOLUTION ORAL 2 TIMES DAILY PRN
Status: DISCONTINUED | OUTPATIENT
Start: 2021-07-08 | End: 2021-07-09 | Stop reason: HOSPADM

## 2021-07-08 RX ORDER — HYDROMORPHONE HYDROCHLORIDE 1 MG/ML
0.1 INJECTION, SOLUTION INTRAMUSCULAR; INTRAVENOUS; SUBCUTANEOUS
Status: DISCONTINUED | OUTPATIENT
Start: 2021-07-08 | End: 2021-07-08 | Stop reason: HOSPADM

## 2021-07-08 RX ORDER — DIPHENHYDRAMINE HCL 25 MG
25 TABLET ORAL EVERY 6 HOURS PRN
Status: DISCONTINUED | OUTPATIENT
Start: 2021-07-08 | End: 2021-07-09 | Stop reason: HOSPADM

## 2021-07-08 RX ORDER — ONDANSETRON 2 MG/ML
4 INJECTION INTRAMUSCULAR; INTRAVENOUS
Status: DISCONTINUED | OUTPATIENT
Start: 2021-07-08 | End: 2021-07-08 | Stop reason: HOSPADM

## 2021-07-08 RX ORDER — ROCURONIUM BROMIDE 10 MG/ML
INJECTION, SOLUTION INTRAVENOUS PRN
Status: DISCONTINUED | OUTPATIENT
Start: 2021-07-08 | End: 2021-07-08 | Stop reason: SURG

## 2021-07-08 RX ORDER — MEPERIDINE HYDROCHLORIDE 25 MG/ML
6.25 INJECTION INTRAMUSCULAR; INTRAVENOUS; SUBCUTANEOUS
Status: DISCONTINUED | OUTPATIENT
Start: 2021-07-08 | End: 2021-07-08 | Stop reason: HOSPADM

## 2021-07-08 RX ORDER — PROMETHAZINE HYDROCHLORIDE 25 MG/1
12.5-25 SUPPOSITORY RECTAL EVERY 4 HOURS PRN
Status: DISCONTINUED | OUTPATIENT
Start: 2021-07-08 | End: 2021-07-09

## 2021-07-08 RX ORDER — BUPIVACAINE HYDROCHLORIDE AND EPINEPHRINE 5; 5 MG/ML; UG/ML
INJECTION, SOLUTION EPIDURAL; INTRACAUDAL; PERINEURAL
Status: DISCONTINUED | OUTPATIENT
Start: 2021-07-08 | End: 2021-07-08 | Stop reason: HOSPADM

## 2021-07-08 RX ORDER — AMOXICILLIN 250 MG
1 CAPSULE ORAL
Status: DISCONTINUED | OUTPATIENT
Start: 2021-07-08 | End: 2021-07-09 | Stop reason: HOSPADM

## 2021-07-08 RX ORDER — HALOPERIDOL 5 MG/ML
1 INJECTION INTRAMUSCULAR
Status: DISCONTINUED | OUTPATIENT
Start: 2021-07-08 | End: 2021-07-08 | Stop reason: HOSPADM

## 2021-07-08 RX ORDER — OXYCODONE HYDROCHLORIDE 5 MG/1
2.5 TABLET ORAL
Status: DISCONTINUED | OUTPATIENT
Start: 2021-07-08 | End: 2021-07-09

## 2021-07-08 RX ORDER — AMOXICILLIN 250 MG
1 CAPSULE ORAL NIGHTLY
Status: DISCONTINUED | OUTPATIENT
Start: 2021-07-08 | End: 2021-07-09 | Stop reason: HOSPADM

## 2021-07-08 RX ORDER — ENEMA 19; 7 G/133ML; G/133ML
1 ENEMA RECTAL
Status: DISCONTINUED | OUTPATIENT
Start: 2021-07-08 | End: 2021-07-09 | Stop reason: HOSPADM

## 2021-07-08 RX ORDER — ONDANSETRON 4 MG/1
4 TABLET, ORALLY DISINTEGRATING ORAL EVERY 4 HOURS PRN
Status: DISCONTINUED | OUTPATIENT
Start: 2021-07-08 | End: 2021-07-09 | Stop reason: HOSPADM

## 2021-07-08 RX ORDER — CEFAZOLIN SODIUM 1 G/3ML
INJECTION, POWDER, FOR SOLUTION INTRAMUSCULAR; INTRAVENOUS PRN
Status: DISCONTINUED | OUTPATIENT
Start: 2021-07-08 | End: 2021-07-08 | Stop reason: SURG

## 2021-07-08 RX ORDER — CEFAZOLIN SODIUM 1 G/3ML
INJECTION, POWDER, FOR SOLUTION INTRAMUSCULAR; INTRAVENOUS
Status: DISCONTINUED | OUTPATIENT
Start: 2021-07-08 | End: 2021-07-08 | Stop reason: HOSPADM

## 2021-07-08 RX ORDER — KETAMINE HYDROCHLORIDE 50 MG/ML
INJECTION, SOLUTION INTRAMUSCULAR; INTRAVENOUS PRN
Status: DISCONTINUED | OUTPATIENT
Start: 2021-07-08 | End: 2021-07-08 | Stop reason: SURG

## 2021-07-08 RX ORDER — HYDROMORPHONE HYDROCHLORIDE 1 MG/ML
0.25 INJECTION, SOLUTION INTRAMUSCULAR; INTRAVENOUS; SUBCUTANEOUS
Status: DISCONTINUED | OUTPATIENT
Start: 2021-07-08 | End: 2021-07-09

## 2021-07-08 RX ORDER — OXYCODONE HYDROCHLORIDE 5 MG/1
5 TABLET ORAL
Status: DISCONTINUED | OUTPATIENT
Start: 2021-07-08 | End: 2021-07-09

## 2021-07-08 RX ORDER — SODIUM CHLORIDE AND POTASSIUM CHLORIDE 150; 900 MG/100ML; MG/100ML
INJECTION, SOLUTION INTRAVENOUS CONTINUOUS
Status: DISCONTINUED | OUTPATIENT
Start: 2021-07-08 | End: 2021-07-09

## 2021-07-08 RX ORDER — CALCIUM CARBONATE 500 MG/1
500 TABLET, CHEWABLE ORAL 2 TIMES DAILY
Status: DISCONTINUED | OUTPATIENT
Start: 2021-07-08 | End: 2021-07-09 | Stop reason: HOSPADM

## 2021-07-08 RX ORDER — HYDROMORPHONE HYDROCHLORIDE 1 MG/ML
0.2 INJECTION, SOLUTION INTRAMUSCULAR; INTRAVENOUS; SUBCUTANEOUS
Status: DISCONTINUED | OUTPATIENT
Start: 2021-07-08 | End: 2021-07-08 | Stop reason: HOSPADM

## 2021-07-08 RX ORDER — TIZANIDINE 4 MG/1
2 TABLET ORAL 3 TIMES DAILY PRN
Status: DISCONTINUED | OUTPATIENT
Start: 2021-07-08 | End: 2021-07-09 | Stop reason: HOSPADM

## 2021-07-08 RX ORDER — ONDANSETRON 2 MG/ML
4 INJECTION INTRAMUSCULAR; INTRAVENOUS EVERY 4 HOURS PRN
Status: DISCONTINUED | OUTPATIENT
Start: 2021-07-08 | End: 2021-07-09 | Stop reason: HOSPADM

## 2021-07-08 RX ORDER — PROCHLORPERAZINE EDISYLATE 5 MG/ML
5-10 INJECTION INTRAMUSCULAR; INTRAVENOUS EVERY 4 HOURS PRN
Status: DISCONTINUED | OUTPATIENT
Start: 2021-07-08 | End: 2021-07-09 | Stop reason: HOSPADM

## 2021-07-08 RX ORDER — HYDROMORPHONE HYDROCHLORIDE 1 MG/ML
0.4 INJECTION, SOLUTION INTRAMUSCULAR; INTRAVENOUS; SUBCUTANEOUS
Status: DISCONTINUED | OUTPATIENT
Start: 2021-07-08 | End: 2021-07-08 | Stop reason: HOSPADM

## 2021-07-08 RX ORDER — DIPHENHYDRAMINE HYDROCHLORIDE 50 MG/ML
25 INJECTION INTRAMUSCULAR; INTRAVENOUS EVERY 6 HOURS PRN
Status: DISCONTINUED | OUTPATIENT
Start: 2021-07-08 | End: 2021-07-09 | Stop reason: HOSPADM

## 2021-07-08 RX ORDER — CEFAZOLIN SODIUM 2 G/100ML
2 INJECTION, SOLUTION INTRAVENOUS EVERY 8 HOURS
Status: COMPLETED | OUTPATIENT
Start: 2021-07-09 | End: 2021-07-09

## 2021-07-08 RX ORDER — LIDOCAINE HYDROCHLORIDE 20 MG/ML
INJECTION, SOLUTION EPIDURAL; INFILTRATION; INTRACAUDAL; PERINEURAL PRN
Status: DISCONTINUED | OUTPATIENT
Start: 2021-07-08 | End: 2021-07-08 | Stop reason: SURG

## 2021-07-08 RX ORDER — PROMETHAZINE HYDROCHLORIDE 25 MG/1
12.5-25 TABLET ORAL EVERY 4 HOURS PRN
Status: DISCONTINUED | OUTPATIENT
Start: 2021-07-08 | End: 2021-07-09 | Stop reason: HOSPADM

## 2021-07-08 RX ORDER — HYDRALAZINE HYDROCHLORIDE 20 MG/ML
10 INJECTION INTRAMUSCULAR; INTRAVENOUS
Status: DISCONTINUED | OUTPATIENT
Start: 2021-07-08 | End: 2021-07-09

## 2021-07-08 RX ORDER — MAGNESIUM HYDROXIDE 1200 MG/15ML
LIQUID ORAL
Status: COMPLETED | OUTPATIENT
Start: 2021-07-08 | End: 2021-07-08

## 2021-07-08 RX ORDER — OXYCODONE HCL 5 MG/5 ML
10 SOLUTION, ORAL ORAL
Status: COMPLETED | OUTPATIENT
Start: 2021-07-08 | End: 2021-07-08

## 2021-07-08 RX ORDER — MIDAZOLAM HYDROCHLORIDE 1 MG/ML
INJECTION INTRAMUSCULAR; INTRAVENOUS PRN
Status: DISCONTINUED | OUTPATIENT
Start: 2021-07-08 | End: 2021-07-08 | Stop reason: SURG

## 2021-07-08 RX ADMIN — FENTANYL CITRATE 50 MCG: 50 INJECTION, SOLUTION INTRAMUSCULAR; INTRAVENOUS at 19:52

## 2021-07-08 RX ADMIN — METAXALONE 800 MG: 800 TABLET ORAL at 05:51

## 2021-07-08 RX ADMIN — SODIUM CHLORIDE, POTASSIUM CHLORIDE, SODIUM LACTATE AND CALCIUM CHLORIDE: 600; 310; 30; 20 INJECTION, SOLUTION INTRAVENOUS at 20:52

## 2021-07-08 RX ADMIN — PROPOFOL 200 MG: 10 INJECTION, EMULSION INTRAVENOUS at 18:44

## 2021-07-08 RX ADMIN — LIDOCAINE HYDROCHLORIDE 2 ML: 20 INJECTION, SOLUTION EPIDURAL; INFILTRATION; INTRACAUDAL at 19:58

## 2021-07-08 RX ADMIN — HYDROMORPHONE HYDROCHLORIDE 0.4 MG: 1 INJECTION, SOLUTION INTRAMUSCULAR; INTRAVENOUS; SUBCUTANEOUS at 20:43

## 2021-07-08 RX ADMIN — GABAPENTIN 600 MG: 300 CAPSULE ORAL at 22:56

## 2021-07-08 RX ADMIN — HYDROMORPHONE HYDROCHLORIDE 0.2 MG: 1 INJECTION, SOLUTION INTRAMUSCULAR; INTRAVENOUS; SUBCUTANEOUS at 21:29

## 2021-07-08 RX ADMIN — SODIUM CHLORIDE, POTASSIUM CHLORIDE, SODIUM LACTATE AND CALCIUM CHLORIDE: 600; 310; 30; 20 INJECTION, SOLUTION INTRAVENOUS at 18:37

## 2021-07-08 RX ADMIN — KETAMINE HYDROCHLORIDE 25 MG: 50 INJECTION INTRAMUSCULAR; INTRAVENOUS at 19:10

## 2021-07-08 RX ADMIN — MIDODRINE HYDROCHLORIDE 10 MG: 5 TABLET ORAL at 22:57

## 2021-07-08 RX ADMIN — EPHEDRINE SULFATE 10 MG: 50 INJECTION, SOLUTION INTRAVENOUS at 19:02

## 2021-07-08 RX ADMIN — OXYCODONE HYDROCHLORIDE 10 MG: 10 TABLET ORAL at 00:14

## 2021-07-08 RX ADMIN — FENTANYL CITRATE 100 MCG: 50 INJECTION, SOLUTION INTRAMUSCULAR; INTRAVENOUS at 18:40

## 2021-07-08 RX ADMIN — ACETAMINOPHEN 1000 MG: 500 TABLET ORAL at 05:52

## 2021-07-08 RX ADMIN — MIDODRINE HYDROCHLORIDE 10 MG: 5 TABLET ORAL at 14:07

## 2021-07-08 RX ADMIN — LIDOCAINE HYDROCHLORIDE 100 MG: 20 INJECTION, SOLUTION EPIDURAL; INFILTRATION; INTRACAUDAL at 18:44

## 2021-07-08 RX ADMIN — FENTANYL CITRATE 50 MCG: 50 INJECTION, SOLUTION INTRAMUSCULAR; INTRAVENOUS at 20:33

## 2021-07-08 RX ADMIN — ACETAMINOPHEN 1000 MG: 500 TABLET ORAL at 00:09

## 2021-07-08 RX ADMIN — FENTANYL CITRATE 50 MCG: 50 INJECTION, SOLUTION INTRAMUSCULAR; INTRAVENOUS at 20:39

## 2021-07-08 RX ADMIN — GABAPENTIN 600 MG: 300 CAPSULE ORAL at 11:06

## 2021-07-08 RX ADMIN — MIDAZOLAM HYDROCHLORIDE 2 MG: 1 INJECTION, SOLUTION INTRAMUSCULAR; INTRAVENOUS at 18:40

## 2021-07-08 RX ADMIN — FENTANYL CITRATE 50 MCG: 50 INJECTION, SOLUTION INTRAMUSCULAR; INTRAVENOUS at 19:35

## 2021-07-08 RX ADMIN — FENTANYL CITRATE 50 MCG: 50 INJECTION, SOLUTION INTRAMUSCULAR; INTRAVENOUS at 20:59

## 2021-07-08 RX ADMIN — ANTACID TABLETS 500 MG: 500 TABLET, CHEWABLE ORAL at 22:57

## 2021-07-08 RX ADMIN — HALOPERIDOL LACTATE 1 MG: 5 INJECTION, SOLUTION INTRAMUSCULAR at 20:45

## 2021-07-08 RX ADMIN — OXYCODONE HYDROCHLORIDE 10 MG: 10 TABLET ORAL at 14:15

## 2021-07-08 RX ADMIN — EPHEDRINE SULFATE 10 MG: 50 INJECTION, SOLUTION INTRAVENOUS at 19:25

## 2021-07-08 RX ADMIN — GABAPENTIN 400 MG: 400 CAPSULE ORAL at 05:51

## 2021-07-08 RX ADMIN — POLYETHYLENE GLYCOL 3350 1 PACKET: 17 POWDER, FOR SOLUTION ORAL at 05:52

## 2021-07-08 RX ADMIN — OXYCODONE HYDROCHLORIDE 10 MG: 10 TABLET ORAL at 09:20

## 2021-07-08 RX ADMIN — OXYCODONE HYDROCHLORIDE 10 MG: 5 SOLUTION ORAL at 20:33

## 2021-07-08 RX ADMIN — HYDROMORPHONE HYDROCHLORIDE 0.4 MG: 1 INJECTION, SOLUTION INTRAMUSCULAR; INTRAVENOUS; SUBCUTANEOUS at 21:24

## 2021-07-08 RX ADMIN — HYDROMORPHONE HYDROCHLORIDE 0.4 MG: 1 INJECTION, SOLUTION INTRAMUSCULAR; INTRAVENOUS; SUBCUTANEOUS at 21:18

## 2021-07-08 RX ADMIN — MIDODRINE HYDROCHLORIDE 10 MG: 5 TABLET ORAL at 05:51

## 2021-07-08 RX ADMIN — ACETAMINOPHEN 1000 MG: 500 TABLET ORAL at 11:06

## 2021-07-08 RX ADMIN — METAXALONE 800 MG: 800 TABLET ORAL at 11:06

## 2021-07-08 RX ADMIN — FENTANYL CITRATE 50 MCG: 50 INJECTION, SOLUTION INTRAMUSCULAR; INTRAVENOUS at 21:09

## 2021-07-08 RX ADMIN — SODIUM CHLORIDE, POTASSIUM CHLORIDE, SODIUM LACTATE AND CALCIUM CHLORIDE: 600; 310; 30; 20 INJECTION, SOLUTION INTRAVENOUS at 12:17

## 2021-07-08 RX ADMIN — DEXAMETHASONE SODIUM PHOSPHATE 10 MG: 4 INJECTION, SOLUTION INTRA-ARTICULAR; INTRALESIONAL; INTRAMUSCULAR; INTRAVENOUS; SOFT TISSUE at 19:00

## 2021-07-08 RX ADMIN — METAXALONE 800 MG: 800 TABLET ORAL at 22:57

## 2021-07-08 RX ADMIN — ROCURONIUM BROMIDE 50 MG: 10 INJECTION, SOLUTION INTRAVENOUS at 18:44

## 2021-07-08 RX ADMIN — HYDROMORPHONE HYDROCHLORIDE 0.4 MG: 1 INJECTION, SOLUTION INTRAMUSCULAR; INTRAVENOUS; SUBCUTANEOUS at 20:50

## 2021-07-08 RX ADMIN — SUGAMMADEX 200 MG: 100 INJECTION, SOLUTION INTRAVENOUS at 19:02

## 2021-07-08 RX ADMIN — EPHEDRINE SULFATE 10 MG: 50 INJECTION, SOLUTION INTRAVENOUS at 19:44

## 2021-07-08 RX ADMIN — HYDROMORPHONE HYDROCHLORIDE 0.2 MG: 1 INJECTION, SOLUTION INTRAMUSCULAR; INTRAVENOUS; SUBCUTANEOUS at 20:59

## 2021-07-08 RX ADMIN — DOCUSATE SODIUM 100 MG: 100 CAPSULE ORAL at 05:51

## 2021-07-08 RX ADMIN — CEFAZOLIN 2 G: 330 INJECTION, POWDER, FOR SOLUTION INTRAMUSCULAR; INTRAVENOUS at 18:48

## 2021-07-08 ASSESSMENT — PAIN DESCRIPTION - PAIN TYPE
TYPE: ACUTE PAIN;SURGICAL PAIN
TYPE: ACUTE PAIN
TYPE: ACUTE PAIN;SURGICAL PAIN
TYPE: ACUTE PAIN
TYPE: ACUTE PAIN;SURGICAL PAIN
TYPE: ACUTE PAIN

## 2021-07-08 ASSESSMENT — FIBROSIS 4 INDEX: FIB4 SCORE: 1.06

## 2021-07-08 ASSESSMENT — ENCOUNTER SYMPTOMS
TINGLING: 1
RESPIRATORY NEGATIVE: 1
MYALGIAS: 1
PSYCHIATRIC NEGATIVE: 1
GASTROINTESTINAL NEGATIVE: 1
NECK PAIN: 1
SENSORY CHANGE: 1
ROS GI COMMENTS: LAST BM 7/7
BACK PAIN: 1
EYES NEGATIVE: 1

## 2021-07-08 NOTE — PROGRESS NOTES
Trauma / Surgical Daily Progress Note    Date of Service  7/8/2021    Chief Complaint  51 y.o. male admitted 7/4/2021 with central cord syndrome and paresthesia.     Interval Events  Plan for surgery today.  Currently NPO    -Eddy bed post surgery   -IV fluids     Discharge planning post op    Review of Systems  Review of Systems   HENT: Negative.    Eyes: Negative.    Respiratory: Negative.    Gastrointestinal: Negative.         Last BM 7/7   Genitourinary:        Voiding   Musculoskeletal: Positive for back pain, myalgias and neck pain.   Neurological: Positive for tingling and sensory change.   Psychiatric/Behavioral: Negative.    All other systems reviewed and are negative.       Vital Signs  Temp:  [36.4 °C (97.5 °F)-37 °C (98.6 °F)] 36.4 °C (97.5 °F)  Pulse:  [46-87] 46  Resp:  [12-16] 12  BP: (122-130)/(84-89) 130/84  SpO2:  [93 %-98 %] 95 %    Physical Exam  Physical Exam  Vitals and nursing note reviewed.   Constitutional:       General: He is not in acute distress.     Appearance: Normal appearance.      Interventions: Cervical collar in place.   HENT:      Right Ear: External ear normal.      Left Ear: External ear normal.      Nose: Nose normal.      Mouth/Throat:      Mouth: Mucous membranes are moist.      Pharynx: Oropharynx is clear.   Eyes:      General:         Right eye: No discharge.         Left eye: No discharge.      Conjunctiva/sclera: Conjunctivae normal.      Pupils: Pupils are equal, round, and reactive to light.   Neck:      Comments: Hard collar in place.  Cardiovascular:      Rate and Rhythm: Normal rate and regular rhythm.      Pulses: Normal pulses.   Pulmonary:      Effort: Pulmonary effort is normal. No respiratory distress.      Breath sounds: Normal breath sounds.   Abdominal:      General: There is no distension.      Palpations: Abdomen is soft.      Tenderness: There is no abdominal tenderness.   Musculoskeletal:         General: No swelling, tenderness, deformity or signs of  injury.      Cervical back: Spinous process tenderness and muscular tenderness present.   Skin:     General: Skin is warm and dry.      Capillary Refill: Capillary refill takes less than 2 seconds.   Neurological:      Mental Status: He is alert and oriented to person, place, and time.      GCS: GCS eye subscore is 4. GCS verbal subscore is 5. GCS motor subscore is 6.      Motor: Weakness present.      Comments: Paraesthesia and weakness to bilateral upper extremities.  Grasps equal.  Moving all extremities X 4.   Psychiatric:         Mood and Affect: Mood normal.         Behavior: Behavior normal.         Thought Content: Thought content normal.         Laboratory  Recent Results (from the past 24 hour(s))   PLATELET MAPPING WITH BASIC TEG    Collection Time: 07/07/21 11:52 AM   Result Value Ref Range    Reaction Time Initial-R 5.2 5.0 - 10.0 min    Clot Kinetics-K 2.2 1.0 - 3.0 min    Clot Angle-Angle 61.3 53.0 - 72.0 degrees    Maximum Clot Strength-MA 57.1 50.0 - 70.0 mm    Lysis 30 minutes-LY30 0.0 0.0 - 8.0 %    % Inhibition ADP 13.4 %    % Inhibition AA 0.0 %    TEG Algorithm Link Algorithm    SARS-COV Antigen JEANETTE: Collect dry nasal swab    Collection Time: 07/07/21 10:15 PM   Result Value Ref Range    SARS-CoV-2 Source Nasal Swab     SARS-COV ANTIGEN JEANETTE NotDetected Not-Detected       Fluids    Intake/Output Summary (Last 24 hours) at 7/8/2021 0953  Last data filed at 7/8/2021 0000  Gross per 24 hour   Intake 4750 ml   Output 2250 ml   Net 2500 ml       Core Measures & Quality Metrics  Core Measures & Quality Metrics  RAP Score Total: 2    ETOH Screening  CAGE Score: 0  Assessment complete date: 7/5/2021 (neg CAGE, neg LIDIA)        Assessment/Plan  Central cord syndrome (HCC)- (present on admission)  Assessment & Plan  Recent C5-C7 fusion about 5-6 weeks ago.  CT imaging with no cervical spine fracture or subluxation. Prior cervical discectomy and anterior fusion from C5 to C7.  MR imaging of the cervical  spine demonstrated prior anterior fusion at C5-C7, multilevel multifactorial degenerative changes, severe central canal and bilateral neural foraminal narrowing at C4-C5.  Midodrine.   7/8 Plan for surgery.  Roberto Hall MD. Neurosurgeon. [facility].    Paresthesia- (present on admission)  Assessment & Plan  Post injury paresthesia in bilateral upper extremities.  Roberto Hall MD. Neurosurgeon. [facility].    Discharge planning issues  Assessment & Plan  Date of admission: 7/4/2021  Date: 7/6 Transfer orders from SICU  Date: PM&R consult on admission.  Cleared for discharge: No  Discharge delayed: No    Discharge date: TBD      Contraindication to deep vein thrombosis (DVT) prophylaxis- (present on admission)  Assessment & Plan  Prophylactic anticoagulation for thrombotic prevention initially contraindicated secondary to elevated bleeding risk.  7/6 Trauma screening bilateral lower extremity venous duplex negative for above knee DVT.    Trauma- (present on admission)  Assessment & Plan  Wakeboarding injury.  Transient upper extremity neurologic symptoms.  Trauma Red Activation.  Christiana Mclean MD. Trauma Surgery.         Discussed patient condition with RN, Patient and trauma surgery Dr. Stapleton.

## 2021-07-08 NOTE — CARE PLAN
The patient is Stable - Low risk of patient condition declining or worsening    Shift Goals  Clinical Goals: Pain control; MAP >85  Patient Goals: pain control; comfort  Family Goals: no family at bedside    Progress made toward(s) clinical / shift goals:    Problem: Pain - Standard  Goal: Alleviation of pain or a reduction in pain to the patient’s comfort goal  Outcome: Progressing  Note: Patient reports pain levels and hyperesthesia decreasing in the BUE with the use of pharmacological interventions. Updated on plan to continue pharm and non-pharm interventions to appropriately manage pain.     Problem: Knowledge Deficit - Standard  Goal: Patient and family/care givers will demonstrate understanding of plan of care, disease process/condition, diagnostic tests and medications  Outcome: Progressing     Problem: Hemodynamics  Goal: Patient's hemodynamics, fluid balance and neurologic status will be stable or improve  Outcome: Progressing  Note: Patient is currently maintaining MAP goals >85 without the use of pharmacological intervention. Patient aware and updated with plan to utilize hyperperfusion protocol as needed.       Patient is not progressing towards the following goals:      Problem: Respiratory  Goal: Patient will achieve/maintain optimum respiratory ventilation and gas exchange  Outcome: Not Progressing  Note: Patient currently requiring 2 L via nasal cannula. Unable to wean patient off of O2 needs at this time. Patient educated and updated on plan to continue to titrate oxygen as needed to maintain adequate oxygen saturation levels.

## 2021-07-08 NOTE — PROGRESS NOTES
Physical Medicine and Rehabilitation Consultation              Date of initial consultation: 7/6/2021  Consulting provider: JARAD Posada   Reason for consultation: assess for acute inpatient rehab appropriateness  LOS: 3 Day(s)    Chief complaint: Central cord syndrome    HPI: The patient is a 51 y.o. right hand dominant male with a past medical history of C5-7 cervical fusion by Dr. Zurdo JACOBS;  who presented on 7/4/2021  9:49 PM with transient quadriparesis after wakeboarding fall.  Seen by neurosurgery, MRI neck showed C4-5 severe critical stenosis with no cord signal change, no bruise, no hematoma, however consistent with cord effacement.  At the time he was seen by neurosurgery, he was complaining of bilateral paresthesias in the upper extremities with some weakness in the distal more than proximal muscles, consistent with central cord injury.  Patient did not require surgical correction emergently, but Dr. Roberto Hall reports that it is likely he will need some form of decompression, to be determined by Dr. Zurdo JACOBS.    The patient currently reports neuropathic pain in all 4 limbs, weakness in bilateral hands.  Patient endorses urinating freely, having bowel movements, no chest pain or shortness of breath.  Patient states he will undergo surgery on Thursday.    7/8/2021  Patient is doing well. He is expected to have surgery this evening. He continues to have neuropathic pain. Discussed increasing gabapentin dose. He is doing quite well, expectations are for him to be able to return home after surgery but we will continue to follow for his rehab needs and make sure he progresses as expected.     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  No  "evidence of acute fracture, cord contusion or epidural hematoma  2.  Prior anterior fusion at C5-C7  3.  Multilevel multifactorial degenerative changes  4.  Severe central canal and BILATERAL neural foraminal narrowing at C4-C5  5.  Other areas of central canal and neural foraminal narrowing as described above    PROCEDURES:  Pending cervical procedure Thursday, 7/8/2021    PMH:  History reviewed. No pertinent past medical history.    PSH:  No past surgical history on file.    FHX:  Non-pertinent to today's issues    Medications:  Current Facility-Administered Medications   Medication Dose   • gabapentin (NEURONTIN) capsule 600 mg  600 mg   • lactated ringers infusion     • midodrine (PROAMATINE) tablet 10 mg  10 mg   • Respiratory Therapy Consult     • docusate sodium (COLACE) capsule 100 mg  100 mg   • senna-docusate (PERICOLACE or SENOKOT S) 8.6-50 MG per tablet 1 tablet  1 tablet   • senna-docusate (PERICOLACE or SENOKOT S) 8.6-50 MG per tablet 1 tablet  1 tablet   • polyethylene glycol/lytes (MIRALAX) PACKET 1 Packet  1 Packet   • magnesium hydroxide (MILK OF MAGNESIA) suspension 30 mL  30 mL   • bisacodyl (DULCOLAX) suppository 10 mg  10 mg   • fleet enema 133 mL  1 Each   • oxyCODONE immediate-release (ROXICODONE) tablet 5 mg  5 mg    Or   • oxyCODONE immediate release (ROXICODONE) tablet 10 mg  10 mg    Or   • HYDROmorphone (Dilaudid) injection 0.5-1 mg  0.5-1 mg   • ondansetron (ZOFRAN) syringe/vial injection 4 mg  4 mg   • metaxalone (Skelaxin) tablet 800 mg  800 mg   • acetaminophen (TYLENOL) tablet 1,000 mg  1,000 mg    Followed by   • [START ON 7/10/2021] acetaminophen (TYLENOL) tablet 1,000 mg  1,000 mg       Allergies:  Allergies   Allergen Reactions   • Morphine Unspecified     Chest pain   • Pcn [Penicillins]      Childhood allergy       Physical Exam:  Vitals: /84   Pulse (!) 46   Temp 36.4 °C (97.5 °F) (Temporal)   Resp 12   Ht 1.93 m (6' 4\")   Wt 95.2 kg (209 lb 14.1 oz)   SpO2 95% "   Gen: NAD  Head: NC/AT  Eyes/ Nose/ Mouth: PERRLA, moist mucous membranes  Cardio: RRR, good distal perfusion, warm extremities  Pulm: normal respiratory effort, no cyanosis   Abd: Soft NTND, negative borborygmi   Ext: No peripheral edema. No calf tenderness. No clubbing.    Mental status: answers questions appropriately follows commands  Speech: fluent, no aphasia or dysarthria    Motor:      Upper Extremity  Myotome R L   Shoulder flexion C5 5 5   Elbow flexion C5 5 5   Wrist extension C6 4/5 4/5   Elbow extension C7 5 5   Finger flexion C8 4/5 4/5   Finger abduction T1 4/5 4/5     Lower Extremity Myotome R L   Hip flexion L2 5 5   Knee extension L3 5 5   Ankle dorsiflexion L4 5 5   Toe extension L5 5 5   Ankle plantarflexion S1 5 5       DTRs:  Right  Left    Brachioradialis  2+  2+   Patella tendon  2+ 2+       Labs: Reviewed and significant for   Recent Labs     07/07/21  0400   RBC 4.77   HEMOGLOBIN 15.0   HEMATOCRIT 42.5   PLATELETCT 250     Recent Labs     07/07/21  0400   SODIUM 139   POTASSIUM 4.3   CHLORIDE 104   CO2 26   GLUCOSE 106*   BUN 13   CREATININE 0.74   CALCIUM 8.8     Recent Results (from the past 24 hour(s))   PLATELET MAPPING WITH BASIC TEG    Collection Time: 07/07/21 11:52 AM   Result Value Ref Range    Reaction Time Initial-R 5.2 5.0 - 10.0 min    Clot Kinetics-K 2.2 1.0 - 3.0 min    Clot Angle-Angle 61.3 53.0 - 72.0 degrees    Maximum Clot Strength-MA 57.1 50.0 - 70.0 mm    Lysis 30 minutes-LY30 0.0 0.0 - 8.0 %    % Inhibition ADP 13.4 %    % Inhibition AA 0.0 %    TEG Algorithm Link Algorithm    SARS-COV Antigen JEANETTE: Collect dry nasal swab    Collection Time: 07/07/21 10:15 PM   Result Value Ref Range    SARS-CoV-2 Source Nasal Swab     SARS-COV ANTIGEN JEANETTE NotDetected Not-Detected         ASSESSMENT:  Patient is a 51 y.o. male admitted with central cord syndrome after fall while wakeboarding with history of prior C5-7 fusion now pending surgery on 7/8 for traumatic disc at C4/5    [facility]  Code / Diagnosis to Support: 0004.120 - Spinal Cord Dysfunction, Non-Traumatic: Quadriplegia, Unspecified    Rehabilitation: Impaired ADLs and mobility  Currently not requiring IPR, will continue to follow    Barriers to transfer include: Insurance authorization, TCCs to verify disposition, medical clearance and bed availability     Additional Recommendations:  -Surgery expected today by Dr. JASS Renner MD  - We will get another round of therapy afterwards to make sure he is still within functional limits to return home.  If patient is requiring intensive rehabilitation after his surgery, we are happy to take him.  At the moment, patient is very strong and only has some weakness in his hands and is not experiencing neurogenic bowel or bladder, therefore recommendations are to plan for discharge home under the supportive care of his family.  -Increasing gabapentin to 600 mg 3 times daily for neuropathic pain.  High risk medication, labs reviewed, creatinine clearance 148.3, GFR greater than 60.  Pretreating patient is neuropathic pain with this higher dose of gabapentin will help postoperative pain and likely decrease his narcotic dependence afterwards.    -Recommend abstaining from opioid medication given patient's 10 years of sobriety  -Agree with midodrine 5 mg 3 times daily  -Will need repeat PT OT evaluations after surgery  -PMR to follow in the periphery for rehab appropriateness, please reach out with questions or request for medical management      Medical Complexity:  Central cord syndrome  Neuropathic pain      DVT PPX: SCDs      Thank you for allowing us to participate in the care of this patient.     Isaiah Norman, DO   Physical Medicine and Rehabilitation

## 2021-07-08 NOTE — ANESTHESIA PREPROCEDURE EVALUATION
C4-5 severe canal stenosis, history of C5-7 ACDF. Had fall on wakeboard and reported transient paralysis after the fall.      Relevant Problems   Other   (positive) Central cord syndrome (HCC)   (positive) Paresthesia   (positive) Trauma   afib upon initial presentation currently sinus  History of HCV-cured    Physical Exam    Airway   Mallampati: II  TM distance: >3 FB  Neck ROM: full       Cardiovascular - normal exam  Rhythm: regular  Rate: normal  (-) murmur     Dental - normal exam           Pulmonary - normal exam  Breath sounds clear to auscultation     Abdominal    Neurological - normal exam                 Anesthesia Plan    ASA 2- EMERGENT (cord compression)       Plan - general       Airway plan will be ETT          Induction: intravenous    Postoperative Plan: Postoperative administration of opioids is intended.    Pertinent diagnostic labs and testing reviewed    Informed Consent:    Anesthetic plan and risks discussed with patient.    Use of blood products discussed with: patient whom consented to blood products.

## 2021-07-08 NOTE — CARE PLAN
The patient is Stable - Low risk of patient condition declining or worsening    Shift Goals  Clinical Goals: Pain control, MAP greater than 85  Patient Goals: pain control; comfort  Family Goals: no family at bedside    Progress made toward(s) clinical / shift goals:    Problem: Pain - Standard  Goal: Alleviation of pain or a reduction in pain to the patient’s comfort goal  Outcome: Not Progressing       Patient is not progressing towards the following goals:      Problem: Pain - Standard  Goal: Alleviation of pain or a reduction in pain to the patient’s comfort goal  Outcome: Not Progressing    Gabapentin dose increased. Patient scheduled for spinal surgery this evening to help further reduce nerve pain.     Problem: Knowledge Deficit - Standard  Goal: Patient and family/care givers will demonstrate understanding of plan of care, disease process/condition, diagnostic tests and medications  Outcome: Progressing     Problem: Skin Integrity  Goal: Skin integrity is maintained or improved  Outcome: Progressing     Problem: Fall Risk  Goal: Patient will remain free from falls  Outcome: Progressing     Problem: Hemodynamics  Goal: Patient's hemodynamics, fluid balance and neurologic status will be stable or improve  Outcome: Progressing     Problem: Respiratory  Goal: Patient will achieve/maintain optimum respiratory ventilation and gas exchange  Outcome: Progressing

## 2021-07-09 ENCOUNTER — APPOINTMENT (OUTPATIENT)
Dept: RADIOLOGY | Facility: MEDICAL CENTER | Age: 51
DRG: 029 | End: 2021-07-09
Attending: NURSE PRACTITIONER
Payer: COMMERCIAL

## 2021-07-09 ENCOUNTER — PHARMACY VISIT (OUTPATIENT)
Dept: PHARMACY | Facility: MEDICAL CENTER | Age: 51
End: 2021-07-09
Payer: COMMERCIAL

## 2021-07-09 VITALS
DIASTOLIC BLOOD PRESSURE: 86 MMHG | TEMPERATURE: 97 F | OXYGEN SATURATION: 94 % | SYSTOLIC BLOOD PRESSURE: 135 MMHG | WEIGHT: 209.88 LBS | RESPIRATION RATE: 33 BRPM | HEIGHT: 76 IN | BODY MASS INDEX: 25.56 KG/M2 | HEART RATE: 73 BPM

## 2021-07-09 PROCEDURE — A9270 NON-COVERED ITEM OR SERVICE: HCPCS | Performed by: NURSE PRACTITIONER

## 2021-07-09 PROCEDURE — 72148 MRI LUMBAR SPINE W/O DYE: CPT

## 2021-07-09 PROCEDURE — A9270 NON-COVERED ITEM OR SERVICE: HCPCS | Performed by: SURGERY

## 2021-07-09 PROCEDURE — 700102 HCHG RX REV CODE 250 W/ 637 OVERRIDE(OP): Performed by: NEUROLOGICAL SURGERY

## 2021-07-09 PROCEDURE — 700102 HCHG RX REV CODE 250 W/ 637 OVERRIDE(OP): Performed by: PHYSICAL MEDICINE & REHABILITATION

## 2021-07-09 PROCEDURE — A9270 NON-COVERED ITEM OR SERVICE: HCPCS | Performed by: PHYSICAL MEDICINE & REHABILITATION

## 2021-07-09 PROCEDURE — 700111 HCHG RX REV CODE 636 W/ 250 OVERRIDE (IP): Performed by: NURSE PRACTITIONER

## 2021-07-09 PROCEDURE — 97530 THERAPEUTIC ACTIVITIES: CPT

## 2021-07-09 PROCEDURE — A9270 NON-COVERED ITEM OR SERVICE: HCPCS | Performed by: NEUROLOGICAL SURGERY

## 2021-07-09 PROCEDURE — 99238 HOSP IP/OBS DSCHRG MGMT 30/<: CPT | Performed by: SURGERY

## 2021-07-09 PROCEDURE — 700102 HCHG RX REV CODE 250 W/ 637 OVERRIDE(OP): Performed by: NURSE PRACTITIONER

## 2021-07-09 PROCEDURE — 97535 SELF CARE MNGMENT TRAINING: CPT

## 2021-07-09 PROCEDURE — RXMED WILLOW AMBULATORY MEDICATION CHARGE: Performed by: NURSE PRACTITIONER

## 2021-07-09 PROCEDURE — 700102 HCHG RX REV CODE 250 W/ 637 OVERRIDE(OP): Performed by: SURGERY

## 2021-07-09 RX ORDER — PREGABALIN 75 MG/1
75 CAPSULE ORAL 3 TIMES DAILY
Status: DISCONTINUED | OUTPATIENT
Start: 2021-07-09 | End: 2021-07-09 | Stop reason: HOSPADM

## 2021-07-09 RX ORDER — OXYCODONE AND ACETAMINOPHEN 10; 325 MG/1; MG/1
1 TABLET ORAL
Status: DISCONTINUED | OUTPATIENT
Start: 2021-07-09 | End: 2021-07-09

## 2021-07-09 RX ORDER — METHYLPREDNISOLONE 4 MG/1
TABLET ORAL
Qty: 21 TABLET | Refills: 0 | Status: SHIPPED | OUTPATIENT
Start: 2021-07-09 | End: 2022-08-17

## 2021-07-09 RX ORDER — HYDROMORPHONE HYDROCHLORIDE 1 MG/ML
0.25 INJECTION, SOLUTION INTRAMUSCULAR; INTRAVENOUS; SUBCUTANEOUS
Status: DISCONTINUED | OUTPATIENT
Start: 2021-07-09 | End: 2021-07-09 | Stop reason: HOSPADM

## 2021-07-09 RX ORDER — OXYCODONE HYDROCHLORIDE 5 MG/1
5-10 TABLET ORAL
Qty: 32 TABLET | Refills: 0 | Status: SHIPPED | OUTPATIENT
Start: 2021-07-09 | End: 2021-07-16

## 2021-07-09 RX ORDER — GABAPENTIN 300 MG/1
600 CAPSULE ORAL 3 TIMES DAILY
Qty: 126 CAPSULE | Refills: 0 | Status: SHIPPED | OUTPATIENT
Start: 2021-07-09 | End: 2021-07-30

## 2021-07-09 RX ORDER — METAXALONE 800 MG/1
800 TABLET ORAL 3 TIMES DAILY PRN
Qty: 48 TABLET | Refills: 0 | Status: SHIPPED | OUTPATIENT
Start: 2021-07-09 | End: 2021-07-30

## 2021-07-09 RX ORDER — ACETAMINOPHEN 500 MG
1000 TABLET ORAL EVERY 6 HOURS
Qty: 30 TABLET | Refills: 0 | COMMUNITY
Start: 2021-07-09 | End: 2022-08-17

## 2021-07-09 RX ORDER — OXYCODONE HYDROCHLORIDE 5 MG/1
5-10 TABLET ORAL
Status: DISCONTINUED | OUTPATIENT
Start: 2021-07-09 | End: 2021-07-09 | Stop reason: HOSPADM

## 2021-07-09 RX ADMIN — TIZANIDINE 2 MG: 4 TABLET ORAL at 07:57

## 2021-07-09 RX ADMIN — OXYCODONE 5 MG: 5 TABLET ORAL at 01:41

## 2021-07-09 RX ADMIN — ACETAMINOPHEN 1000 MG: 500 TABLET ORAL at 05:53

## 2021-07-09 RX ADMIN — DEXAMETHASONE SODIUM PHOSPHATE 4 MG: 4 INJECTION, SOLUTION INTRA-ARTICULAR; INTRALESIONAL; INTRAMUSCULAR; INTRAVENOUS; SOFT TISSUE at 12:11

## 2021-07-09 RX ADMIN — CEFAZOLIN SODIUM 2 G: 2 INJECTION, SOLUTION INTRAVENOUS at 03:22

## 2021-07-09 RX ADMIN — DEXAMETHASONE SODIUM PHOSPHATE 4 MG: 4 INJECTION, SOLUTION INTRA-ARTICULAR; INTRALESIONAL; INTRAMUSCULAR; INTRAVENOUS; SOFT TISSUE at 01:41

## 2021-07-09 RX ADMIN — BENZOCAINE AND MENTHOL 1 LOZENGE: 15; 3.6 LOZENGE ORAL at 01:54

## 2021-07-09 RX ADMIN — OXYCODONE 5 MG: 5 TABLET ORAL at 09:02

## 2021-07-09 RX ADMIN — METAXALONE 800 MG: 800 TABLET ORAL at 05:53

## 2021-07-09 RX ADMIN — METAXALONE 800 MG: 800 TABLET ORAL at 11:03

## 2021-07-09 RX ADMIN — ACETAMINOPHEN 1000 MG: 500 TABLET ORAL at 11:04

## 2021-07-09 RX ADMIN — PREGABALIN 75 MG: 75 CAPSULE ORAL at 11:04

## 2021-07-09 RX ADMIN — GABAPENTIN 600 MG: 300 CAPSULE ORAL at 05:53

## 2021-07-09 RX ADMIN — DEXAMETHASONE SODIUM PHOSPHATE 4 MG: 4 INJECTION, SOLUTION INTRA-ARTICULAR; INTRALESIONAL; INTRAMUSCULAR; INTRAVENOUS; SOFT TISSUE at 07:56

## 2021-07-09 RX ADMIN — OXYCODONE 10 MG: 5 TABLET ORAL at 12:11

## 2021-07-09 RX ADMIN — MIDODRINE HYDROCHLORIDE 10 MG: 5 TABLET ORAL at 05:53

## 2021-07-09 RX ADMIN — DOCUSATE SODIUM 100 MG: 100 CAPSULE, LIQUID FILLED ORAL at 05:53

## 2021-07-09 RX ADMIN — CEFAZOLIN SODIUM 2 G: 2 INJECTION, SOLUTION INTRAVENOUS at 11:22

## 2021-07-09 RX ADMIN — OXYCODONE 5 MG: 5 TABLET ORAL at 05:53

## 2021-07-09 ASSESSMENT — PAIN DESCRIPTION - PAIN TYPE
TYPE: ACUTE PAIN;SURGICAL PAIN

## 2021-07-09 ASSESSMENT — ENCOUNTER SYMPTOMS
SENSORY CHANGE: 1
GASTROINTESTINAL NEGATIVE: 1
ROS GI COMMENTS: LAST BM 7/7
PSYCHIATRIC NEGATIVE: 1
MYALGIAS: 1
TINGLING: 1
EYES NEGATIVE: 1
RESPIRATORY NEGATIVE: 1
NECK PAIN: 1
BACK PAIN: 1

## 2021-07-09 ASSESSMENT — COGNITIVE AND FUNCTIONAL STATUS - GENERAL
MOBILITY SCORE: 24
SUGGESTED CMS G CODE MODIFIER MOBILITY: CH
HELP NEEDED FOR BATHING: A LITTLE
SUGGESTED CMS G CODE MODIFIER DAILY ACTIVITY: CK
DRESSING REGULAR UPPER BODY CLOTHING: A LITTLE
TOILETING: A LITTLE
EATING MEALS: A LITTLE
DRESSING REGULAR LOWER BODY CLOTHING: A LITTLE
DAILY ACTIVITIY SCORE: 18
PERSONAL GROOMING: A LITTLE

## 2021-07-09 ASSESSMENT — GAIT ASSESSMENTS
GAIT LEVEL OF ASSIST: SUPERVISED
DISTANCE (FEET): 300

## 2021-07-09 NOTE — THERAPY
"Physical Therapy   Daily Treatment     Patient Name: Osmani Fitzpatrick  Age:  51 y.o., Sex:  male  Medical Record #: [id number]  Today's Date: 7/9/2021     Precautions: Fall Risk, Cervical Collar  , Spinal / Back Precautions     Assessment    Pt is up and moving well, appears clear on \"overdoing it after my last surgery\" and now clear on precautions, plans to have family help. See below for details. Pt is to dc home today.     Plan    Patient discharged from facility.    DC Equipment Recommendations: None  Discharge Recommendations: Anticipate that the patient will have no further physical therapy needs after discharge from the hospital         Objective       07/09/21 0920   Precautions   Precautions Fall Risk;Cervical Collar  ;Spinal / Back Precautions    Comments c collar AAT   Cognition    Cognition / Consciousness WDL   Level of Consciousness Alert   Comments pt reports that he thinks he \"overdid it\" after his first neck surgery and feels that he should take it easy and be more careful this time. Education reinforced re precautions, pt attentive, reports plan to have kids help at home and will not be doing aggressive activity at work ()   Gait Analysis   Gait Level Of Assist Supervised   Assistive Device None   Distance (Feet) 300   # of Stairs Climbed 0  (pt declines to practice stairs, see below)   Comments discussion re stairs and danger due to collar blocks seeing feet. Pt reports clear on need to use railing and no multitasking on stairs, no carrying.    Bed Mobility    Scooting Supervised   Comments Discussion re OOB, pt reports that earlier, he made a mistake coming up by sitting forward (saskia knife sit up). Pt reports clear on need to use log roll, plans to continue this at home.    Functional Mobility   Sit to Stand Supervised   Bed, Chair, Wheelchair Transfer Supervised   Transfer Method Stand Pivot   Comments Pt reports clear on need to avoid overhead work, lifting greater than 10 lbs and " twisting neck. hanout in room.    Short Term Goals    Short Term Goal # 1 pt will be able to verbalize c-spine precautions in 6tx in order to improve prognosis   Goal Outcome # 1 Goal met   Short Term Goal # 2 pt will be able to complete functional transfers with no AD and SPV in 6tx in order to return to prior level   Goal Outcome # 2 Goal met   Short Term Goal # 3 pt will be able to ambulate 300ft with no AD and SPV in 6tx in order to return home   Goal Outcome # 3 Goal met   Short Term Goal # 4 pt will be able to negotiate FOS with SPV in 6tx in order to return home   Goal Outcome # 4 Goal not met   Anticipated Discharge Equipment and Recommendations   DC Equipment Recommendations None   Discharge Recommendations Anticipate that the patient will have no further physical therapy needs after discharge from the hospital

## 2021-07-09 NOTE — ANESTHESIA POSTPROCEDURE EVALUATION
Patient: Osmani Fitzpatrick    Procedure Summary     Date: 07/08/21 Room / Location: [facility] 08 / SURGERY [facility]    Anesthesia Start: 1837 Anesthesia Stop: 2012    Procedure: CERVICAL 4-5 ANTERIOR CERVICAL DISCECTOMY WITH FUSION, MINIMALLY INVASIVE (N/A Neck) Diagnosis: (SEE SURGEON'S POST-OP NOTE)    Surgeons: Harvey Renner M.D. Responsible Provider: Hardik Muhammad M.D.    Anesthesia Type: general ASA Status: 2 - Emergent          Final Anesthesia Type: general  Last vitals  BP   Blood Pressure: 143/86, NIBP: 126/91    Temp   36.6 °C (97.8 °F)    Pulse   67   Resp   (!) 10    SpO2   94 %      Anesthesia Post Evaluation    Patient location during evaluation: PACU  Patient participation: complete - patient participated  Level of consciousness: awake and alert    Airway patency: patent  Anesthetic complications: no  Cardiovascular status: hemodynamically stable  Respiratory status: acceptable  Hydration status: euvolemic    PONV: none          No complications documented.     Nurse Pain Score: 5 (NPRS)

## 2021-07-09 NOTE — DISCHARGE PLANNING
Meds-to-Beds: Discharge prescription orders listed below delivered to patient at discharge steve. RN Dalia notified. Patient counseled.  Patient elected to have co-payment billed to patient account.      Osmani Fitzpatrick   Home Medication Instructions TYLER:[id number]    Printed on:07/09/21 0328   Medication Information                      gabapentin (NEURONTIN) 300 MG Cap  Take 2 Capsules by mouth 3 times a day for 21 days.             metaxalone (SKELAXIN) 800 MG Tab  Take 1 tablet by mouth 3 times a day as needed for Mild Pain, Moderate Pain or Muscle Spasms for up to 21 days.             methylPREDNISolone (MEDROL DOSEPAK) 4 MG Tablet Therapy Pack  Follow schedule on package instructions.             oxyCODONE immediate-release (ROXICODONE) 5 MG Tab  Take 1-2 Tablets by mouth every 3 hours as needed for up to 7 days.                 Thu Meek, PharmD

## 2021-07-09 NOTE — PROGRESS NOTES
Pt given discharge information on medications, education, activities and follow up. Pt verbalized understanding of discharge instructions. Pt DC home with relative via car at 1325.    PIV removed prior to arriving to [place] lounge and all belongings with the pt.   COVID vaccine refused.

## 2021-07-09 NOTE — PROGRESS NOTES
Neurosurgery    Patient seen and examined.   He is improving neurologically but still neck and low back pain.   Has significant disc - osteophyte complex at the level above his cervical fusion with central cord syndrome.     I have discussed the procedure of anterior cervical discectomy and fusion at C45 with the patient.   Informed consent obtained.         Patient scheduled for surgery and will proceed.

## 2021-07-09 NOTE — OR NURSING
Report given to Coco RN, Pt and VS stable, pain somewhat tolerable per patient, pt does have decrease sats at time and sleepy. Dressing CDI.

## 2021-07-09 NOTE — OP REPORT
DATE OF SERVICE:  07/08/2021     PREOPERATIVE DIAGNOSES:    1.  Status post anterior cervical diskectomy and fusion, C5-6, C6-7.  2.  Traumatic cervical disk, C4-5 with myelopathy.     PROCEDURES:  1.  Minimally invasive anterior cervical diskectomy with osteophytectomy and   bilateral foraminotomy.  2.  Minimally invasive fusion with the use of Globus integrated cage plate   system, 10 mm in height with DBX bone graft and stabilization with 16 mm   screws.     SURGEON:  Harvey Renner MD     ASSISTANT:  AURORA Alexander     ANESTHESIA:  General anesthetic.     ANESTHESIOLOGIST:  Hardik Muhammad MD was the anesthetist.     PREPARATION:  The patient had blood pressure kept at approximately 80-85 mean   arterial pressure.  He was prepared for somatosensory evoked potentials, motor   evokes and EMGs.     DESCRIPTION OF PROCEDURE:  The patient was brought to the operating room and   following induction, he was intubated and placed under general anesthetic.    Arterial line was placed and blood pressure was kept with a mean arterial   pressure above 85.  A roll was placed underneath the shoulder blades.  Neck   was placed in extension and shoulders were taped down at the side after we   took off his cervical collar.  The neck was shaved, prepped and draped in a   sterile fashion, and proposed incision site was marked out with fluoroscopy   after timeout.  A transverse incision was made and carried down to the   platysma.  Hemostasis was obtained with bipolar cautery.     We dissected medial to the sternocleidomastoid and the carotid artery and   identified the avascular plane over the instrumentation at C5, and the C4-5   disk space.  Monopolar cautery was used to dissect off the longus colli and   retractors were placed to maintain exposure.     We had minimal exposure, opening the disk with monopolar cautery and curetting   out degenerative disk material with upbiting curettes.  AM12 was used to    drill back to osteophytic spur posteriorly, and this was drilled off further   with the AM8.  We opened the posterior longitudinal ligament and pulled   forward degenerative cartilaginous disk, which was propulsed into the spinal   canal.  We were able to pull this forward, with nerve hook, and used #1 and #2   Kerrisons to open the posterior longitudinal ligament and decompress   centrally.  We decompressed 10 mm in height, and perform bilateral   foraminotomies.  On the left hand side, there was marked compression of the   nerve root, and tight stenosis and this was relieved with #2 and #3 Kerrisons.     A 10 trial adequately fit, and therefore we used a 10 in height Globus   integrated cage plate system, tamping this into solid position.  It was loaded   with DBX.  A 60 mm screws were placed superior and inferior, stabilizing the   plate of the cage plate system into position.  Locking screws were secured.    Motor evoked potentials remained stable throughout the case.  The area was   copiously irrigated.  Meticulous hemostasis was obtained.  We closed the   platysma with 3-0 Vicryl suture and subcuticular layer with 3-0 Stratafix.    Steri-Strips were used on the skin.  All sponge and needle counts were   correct.  Estimated blood loss negligible.        ______________________________  MD DIONY DAY/DEIRDRE/TARA    DD:  07/08/2021 20:06  DT:  07/08/2021 20:46    Job#:  [id number]

## 2021-07-09 NOTE — PROGRESS NOTES
[facility], IV catheters were removed and all patient belongings were gathered.

## 2021-07-09 NOTE — DISCHARGE INSTRUCTIONS
DC Instructions:  Ok to shower & allow incision to become wet 7/10/21 - remove dressing & leave open to air  No Aspirin or non-steroidal anti-inflammatory medications (aleve, motrin, ibuprofen, celebrex)  No driving for 2 weeks/ No driving while on narcotic medication  Over the counter stool softeners daily while on narcotics  No lifting greater than 15 pounds, no repetitive motion above shoulder level  Follow up at [facility] 2 weeks after surgery  -Continue Collar at all times except with removing post shower to dry neck and change pads.    - Call or seek medical attention for questions or concerns  - Follow up with Dr. Renner.  in 1-2 weeks time. Call his private office to schedule follow up.   - Follow up with primary care provider within one weeks time. Review your home medication with your primary care doctor.    - Resume regular diet  - May take over the counter acetaminophen. Avoid NSAIDS, Aspirin, Ibuprofen if you have been advise to do so by your Neurosurgeon.   - May use OTC 4% lidocaine patches if unable to fill Lidoderm patches  - Continue daily over the counter stool softener while on narcotics  - No operation of machinery or motorized vehicles while under the influence of narcotics  - No alcohol, marijuana or illicit drug use while under the influence of narcotics  - In the event of a narcotic overdose naloxone (Narcan) is available without a prescription from any [facility] or [facility]  - Hospital staff are unable to refill your pain medication. You will need to contact your PCP or surgeon's office for refills  - No swimming, hot tubs, baths or wound submersion until cleared by outpatient provider. May shower  - No contact sports, strenuous activities, or heavy lifting until cleared by outpatient provider  - If respiratory decompensation, persistent or worsening pain, fever or signs or symptoms of infection occur seek medical attention    Discharge Instructions    Discharged to home by  car with relative. Discharged via wheelchair, hospital escort: Yes.  Special equipment needed: C-Collar    Be sure to schedule a follow-up appointment with your primary care doctor or any specialists as instructed.     Discharge Plan:   Diet Plan: Discussed  Activity Level: Discussed  Confirmed Follow up Appointment: Appointment Scheduled  Confirmed Symptoms Management: Discussed  Medication Reconciliation Updated: Yes    I understand that a diet low in cholesterol, fat, and sodium is recommended for good health. Unless I have been given specific instructions below for another diet, I accept this instruction as my diet prescription.   Other diet: discussed    Special Instructions: None    · Is patient discharged on Warfarin / Coumadin?   No     Depression / Suicide Risk    As you are discharged from this [facility] facility, it is important to learn how to keep safe from harming yourself.    Recognize the warning signs:  · Abrupt changes in personality, positive or negative- including increase in energy   · Giving away possessions  · Change in eating patterns- significant weight changes-  positive or negative  · Change in sleeping patterns- unable to sleep or sleeping all the time   · Unwillingness or inability to communicate  · Depression  · Unusual sadness, discouragement and loneliness  · Talk of wanting to die  · Neglect of personal appearance   · Rebelliousness- reckless behavior  · Withdrawal from people/activities they love  · Confusion- inability to concentrate     If you or a loved one observes any of these behaviors or has concerns about self-harm, here's what you can do:  · Talk about it- your feelings and reasons for harming yourself  · Remove any means that you might use to hurt yourself (examples: pills, rope, extension cords, firearm)  · Get professional help from the community (Mental Health, Substance Abuse, psychological counseling)  · Do not be alone:Call your Safe Contact- someone whom you  trust who will be there for you.  · Call your local CRISIS HOTLINE 899-6642 or 116-502-0813  · Call your local Children's Mobile Crisis Response Team [place] (546) 288-9316 or www.Judicata  · Call the toll free National Suicide Prevention Hotlines   · National Suicide Prevention Lifeline 322-883-GUVW (4428)  · National Hope Line Network 800-SUICIDE (138-6607)    Gabapentin capsules or tablets  What is this medicine?  GABAPENTIN (GA ba pen tin) is used to control seizures in certain types of epilepsy. It is also used to treat certain types of nerve pain.  This medicine may be used for other purposes; ask your health care provider or pharmacist if you have questions.  COMMON BRAND NAME(S): Active-PAC with Gabapentin, Gabarone, Neurontin  What should I tell my health care provider before I take this medicine?  They need to know if you have any of these conditions:  · history of drug abuse or alcohol abuse problem  · kidney disease  · lung or breathing disease  · suicidal thoughts, plans, or attempt; a previous suicide attempt by you or a family member  · an unusual or allergic reaction to gabapentin, other medicines, foods, dyes, or preservatives  · pregnant or trying to get pregnant  · breast-feeding  How should I use this medicine?  Take this medicine by mouth with a glass of water. Follow the directions on the prescription label. You can take it with or without food. If it upsets your stomach, take it with food. Take your medicine at regular intervals. Do not take it more often than directed. Do not stop taking except on your doctor's advice.  If you are directed to break the 600 or 800 mg tablets in half as part of your dose, the extra half tablet should be used for the next dose. If you have not used the extra half tablet within 28 days, it should be thrown away.  A special MedGuide will be given to you by the pharmacist with each prescription and refill. Be sure to read this information carefully  each time.  Talk to your pediatrician regarding the use of this medicine in children. While this drug may be prescribed for children as young as 3 years for selected conditions, precautions do apply.  Overdosage: If you think you have taken too much of this medicine contact a poison control center or emergency room at once.  NOTE: This medicine is only for you. Do not share this medicine with others.  What if I miss a dose?  If you miss a dose, take it as soon as you can. If it is almost time for your next dose, take only that dose. Do not take double or extra doses.  What may interact with this medicine?  This medicine may interact with the following medications:  · alcohol  · antihistamines for allergy, cough, and cold  · certain medicines for anxiety or sleep  · certain medicines for depression like amitriptyline, fluoxetine, sertraline  · certain medicines for seizures like phenobarbital, primidone  · certain medicines for stomach problems  · general anesthetics like halothane, isoflurane, methoxyflurane, propofol  · local anesthetics like lidocaine, pramoxine, tetracaine  · medicines that relax muscles for surgery  · narcotic medicines for pain  · phenothiazines like chlorpromazine, mesoridazine, prochlorperazine, thioridazine  This list may not describe all possible interactions. Give your health care provider a list of all the medicines, herbs, non-prescription drugs, or dietary supplements you use. Also tell them if you smoke, drink alcohol, or use illegal drugs. Some items may interact with your medicine.  What should I watch for while using this medicine?  Visit your doctor or health care provider for regular checks on your progress. You may want to keep a record at home of how you feel your condition is responding to treatment. You may want to share this information with your doctor or health care provider at each visit. You should contact your doctor or health care provider if your seizures get worse or  if you have any new types of seizures. Do not stop taking this medicine or any of your seizure medicines unless instructed by your doctor or health care provider. Stopping your medicine suddenly can increase your seizures or their severity.  This medicine may cause serious skin reactions. They can happen weeks to months after starting the medicine. Contact your health care provider right away if you notice fevers or flu-like symptoms with a rash. The rash may be red or purple and then turn into blisters or peeling of the skin. Or, you might notice a red rash with swelling of the face, lips or lymph nodes in your neck or under your arms.  Wear a medical identification bracelet or chain if you are taking this medicine for seizures, and carry a card that lists all your medications.  You may get drowsy, dizzy, or have blurred vision. Do not drive, use machinery, or do anything that needs mental alertness until you know how this medicine affects you. To reduce dizzy or fainting spells, do not sit or stand up quickly, especially if you are an older patient. Alcohol can increase drowsiness and dizziness. Avoid alcoholic drinks.  Your mouth may get dry. Chewing sugarless gum or sucking hard candy, and drinking plenty of water will help.  The use of this medicine may increase the chance of suicidal thoughts or actions. Pay special attention to how you are responding while on this medicine. Any worsening of mood, or thoughts of suicide or dying should be reported to your health care provider right away.  Women who become pregnant while using this medicine may enroll in the [place] Antiepileptic Drug Pregnancy Registry by calling 1-158.682.2521. This registry collects information about the safety of antiepileptic drug use during pregnancy.  What side effects may I notice from receiving this medicine?  Side effects that you should report to your doctor or health care professional as soon as possible:  · allergic  reactions like skin rash, itching or hives, swelling of the face, lips, or tongue  · breathing problems  · rash, fever, and swollen lymph nodes  · redness, blistering, peeling or loosening of the skin, including inside the mouth  · suicidal thoughts, mood changes  Side effects that usually do not require medical attention (report to your doctor or health care professional if they continue or are bothersome):  · dizziness  · drowsiness  · headache  · nausea, vomiting  · swelling of ankles, feet, hands  · tiredness  This list may not describe all possible side effects. Call your doctor for medical advice about side effects. You may report side effects to FDA at 2-380-MQZ-9482.  Where should I keep my medicine?  Keep out of reach of children.  This medicine may cause accidental overdose and death if it taken by other adults, children, or pets. Mix any unused medicine with a substance like cat litter or coffee grounds. Then throw the medicine away in a sealed container like a sealed bag or a coffee can with a lid. Do not use the medicine after the expiration date.  Store at room temperature between 15 and 30 degrees C (59 and 86 degrees F).  NOTE: This sheet is a summary. It may not cover all possible information. If you have questions about this medicine, talk to your doctor, pharmacist, or health care provider.  © 2020 [employer]vier/Gold Standard (2020-03-20 14:16:43)  Methylprednisolone tablets  What is this medicine?  METHYLPREDNISOLONE (meth ill pred NISS oh lone) is a corticosteroid. It is commonly used to treat inflammation of the skin, joints, lungs, and other organs. Common conditions treated include asthma, allergies, and arthritis. It is also used for other conditions, such as blood disorders and diseases of the adrenal glands.  This medicine may be used for other purposes; ask your health care provider or pharmacist if you have questions.  COMMON BRAND NAME(S): Medrol, Medrol Dosepak  What should I tell my health  care provider before I take this medicine?  They need to know if you have any of these conditions:  · Cushing's syndrome  · eye disease, vision problems  · diabetes  · glaucoma  · heart disease  · high blood pressure  · infection (especially a virus infection such as chickenpox, cold sores, or herpes)  · liver disease  · mental illness  · myasthenia gravis  · osteoporosis  · recently received or scheduled to receive a vaccine  · seizures  · stomach or intestine problems  · thyroid disease  · an unusual or allergic reaction to lactose, methylprednisolone, other medicines, foods, dyes, or preservatives  · pregnant or trying to get pregnant  · breast-feeding  How should I use this medicine?  Take this medicine by mouth with a glass of water. Follow the directions on the prescription label. Take this medicine with food. If you are taking this medicine once a day, take it in the morning. Do not take it more often than directed. Do not suddenly stop taking your medicine because you may develop a severe reaction. Your doctor will tell you how much medicine to take. If your doctor wants you to stop the medicine, the dose may be slowly lowered over time to avoid any side effects.  Talk to your pediatrician regarding the use of this medicine in children. Special care may be needed.  Overdosage: If you think you have taken too much of this medicine contact a poison control center or emergency room at once.  NOTE: This medicine is only for you. Do not share this medicine with others.  What if I miss a dose?  If you miss a dose, take it as soon as you can. If it is almost time for your next dose, talk to your doctor or health care professional. You may need to miss a dose or take an extra dose. Do not take double or extra doses without advice.  What may interact with this medicine?  Do not take this medicine with any of the following medications:  · alefacept  · echinacea  · live virus  vaccines  · metyrapone  · mifepristone  This medicine may also interact with the following medications:  · amphotericin B  · aspirin and aspirin-like medicines  · certain antibiotics like erythromycin, clarithromycin, troleandomycin  · certain medicines for diabetes  · certain medicines for fungal infections like ketoconazole  · certain medicines for seizures like carbamazepine, phenobarbital, phenytoin  · certain medicines that treat or prevent blood clots like warfarin  · cholestyramine  · cyclosporine  · digoxin  · diuretics  · female hormones, like estrogens and birth control pills  · isoniazid  · NSAIDs, medicines for pain inflammation, like ibuprofen or naproxen  · other medicines for myasthenia gravis  · rifampin  · vaccines  This list may not describe all possible interactions. Give your health care provider a list of all the medicines, herbs, non-prescription drugs, or dietary supplements you use. Also tell them if you smoke, drink alcohol, or use illegal drugs. Some items may interact with your medicine.  What should I watch for while using this medicine?  Tell your doctor or healthcare professional if your symptoms do not start to get better or if they get worse. Do not stop taking except on your doctor's advice. You may develop a severe reaction. Your doctor will tell you how much medicine to take.  This medicine may increase your risk of getting an infection. Tell your doctor or health care professional if you are around anyone with measles or chickenpox, or if you develop sores or blisters that do not heal properly.  This medicine may increase blood sugar levels. Ask your healthcare provider if changes in diet or medicines are needed if you have diabetes.  Tell your doctor or health care professional right away if you have any change in your eyesight.  Using this medicine for a long time may increase your risk of low bone mass. Talk to your doctor about bone health.  What side effects may I notice from  receiving this medicine?  Side effects that you should report to your doctor or health care professional as soon as possible:  · allergic reactions like skin rash, itching or hives, swelling of the face, lips, or tongue  · bloody or tarry stools  · hallucination, loss of contact with reality  · muscle cramps  · muscle pain  · palpitations  · signs and symptoms of high blood sugar such as being more thirsty or hungry or having to urinate more than normal. You may also feel very tired or have blurry vision.  · signs and symptoms of infection like fever or chills; cough; sore throat; pain or trouble passing urine  Side effects that usually do not require medical attention (report to your doctor or health care professional if they continue or are bothersome):  · changes in emotions or mood  · constipation  · diarrhea  · excessive hair growth on the face or body  · headache  · nausea, vomiting  · trouble sleeping  · weight gain  This list may not describe all possible side effects. Call your doctor for medical advice about side effects. You may report side effects to FDA at 7-224-FDA-7929.  Where should I keep my medicine?  Keep out of the reach of children.  Store at room temperature between 20 and 25 degrees C (68 and 77 degrees F). Throw away any unused medicine after the expiration date.  NOTE: This sheet is a summary. It may not cover all possible information. If you have questions about this medicine, talk to your doctor, pharmacist, or health care provider.  © 2020 [employer]vier/Gold Standard (2019-09-19 09:19:36)    Metaxalone tablets  What is this medicine?  METAXALONE (me TAX a lone) is a muscle relaxer. It is used to treat pain and stiffness in muscles caused by strains, sprains, or other injury.  This medicine may be used for other purposes; ask your health care provider or pharmacist if you have questions.  COMMON BRAND NAME(S): Metaxall, Skelaxin  What should I tell my health care provider before I take this  medicine?  They need to know if you have any of these conditions:  · anemia or blood disorder  · kidney disease  · liver disease  · an unusual or allergic reaction to metaxalone, other medicines, foods, dyes, or preservatives  · pregnant or trying to get pregnant  · breast-feeding  How should I use this medicine?  Take this medicine by mouth. Swallow it with a full glass of water. Follow the directions on the prescription label. Do not take more medicine than you are told to take.  Talk to your pediatrician regarding the use of this medicine in children. While this drug may be prescribed for children as young as 13 years of age for selected conditions, precautions do apply.  Overdosage: If you think you have taken too much of this medicine contact a poison control center or emergency room at once.  NOTE: This medicine is only for you. Do not share this medicine with others.  What if I miss a dose?  If you miss a dose, take it as soon as you can. If it is almost time for your next dose, take only that dose. Do not take double or extra doses.  What may interact with this medicine?  Do not take this medication with any of the following medicines:  · narcotic medicines for cough  This medicine may also interact with the following medications:  · alcohol  · antihistamines for allergy, cough and cold  · certain medicines for anxiety or sleep  · certain medicines for depression like amitriptyline, fluoxetine, sertraline  · certain medicines for seizures like phenobarbital, primidone  · general anesthetics like halothane, isoflurane, methoxyflurane, propofol  · local anesthetics like lidocaine, pramoxine, tetracaine  · medicines that relax muscles for surgery  · narcotic medicines for pain  · phenothiazines like chlorpromazine, mesoridazine, prochlorperazine, thioridazine  This list may not describe all possible interactions. Give your health care provider a list of all the medicines, herbs, non-prescription drugs, or  dietary supplements you use. Also tell them if you smoke, drink alcohol, or use illegal drugs. Some items may interact with your medicine.  What should I watch for while using this medicine?  Tell your doctor or health care professional if your symptoms do not start to get better or if they get worse.  You may get drowsy or dizzy. Do not drive, use machinery, or do anything that needs mental alertness until you know how this medicine affects you. Do not stand or sit up quickly, especially if you are an older patient. This reduces the risk of dizzy or fainting spells. Alcohol may interfere with the effect of this medicine. Avoid alcoholic drinks.  If you are taking another medicine that also causes drowsiness, you may have more side effects. Give your health care provider a list of all medicines you use. Your doctor will tell you how much medicine to take. Do not take more medicine than directed. Call emergency for help if you have problems breathing or unusual sleepiness.  What side effects may I notice from receiving this medicine?  Side effects that you should report to your doctor or health care professional as soon as possible:  · allergic reactions like skin rash, itching or hives, swelling of the face, lips, or tongue  · breathing problems  · unusually weak or tired  Side effects that usually do not require medical attention (report to your doctor or health care professional if they continue or are bothersome):  · anxious  · headache  · irritability  · upset stomach  This list may not describe all possible side effects. Call your doctor for medical advice about side effects. You may report side effects to FDA at 5-249-MTJ-5970.  Where should I keep my medicine?  Keep out of the reach of children.  Store at room temperature between 15 and 30 degrees C (59 and 86 degrees F). Keep container tightly closed. Throw away any unused medicine after the expiration date.  NOTE: This sheet is a summary. It may not cover  all possible information. If you have questions about this medicine, talk to your doctor, pharmacist, or health care provider.  © 2020 [employer]vier/Gold Standard (2016-09-27 12:20:58)

## 2021-07-09 NOTE — ANESTHESIA PROCEDURE NOTES
Arterial Line  Performed by: Hardik Muhammad M.D.  Authorized by: Hardik Muhammad M.D.     Start Time:  7/8/2021 6:46 PM  End Time:  7/8/2021 6:47 PM  Localization: surface landmarks    Patient Location:  OR  Indication: continuous blood pressure monitoring        Catheter Size:  20 G  Seldinger Technique?: Yes    Laterality:  Right  Site:  Radial artery  Line Secured:  Antimicrobial disc, tape and transparent dressing  Events: patient tolerated procedure well with no complications     Alcohol prep, arrow catheter, secured with tegaderm and tape

## 2021-07-09 NOTE — THERAPY
"Occupational Therapy Daily Treatment     Patient Name: Osmani Fitzpatrick  Age:  51 y.o., Sex:  male  Medical Record #: [id number]  Today's Date: 7/9/2021     Precautions  Precautions: Fall Risk, Spinal / Back Precautions , Cervical Collar    Comments: c-collar AAT per chart    Assessment    Pt now s/p C5-C7 fusion w/ cage placement and C4-C5 diskectomy. He is at a supv level for functional mobility, transfers, light UB ADLs. Discussed dressing strategies to adhere to spine precautions. Pt provided with hard resistance foam block for hand strengthening. Still w/ hypersensitivity in BUE.     Plan    Continue current treatment plan.    DC Equipment Recommendations: Unable to determine at this time  Discharge Recommendations: Recommend outpatient occupational therapy services to address higher level deficits.      Subjective    \"Ouuuuch that hurts\" when washing hands     Objective       07/09/21 0855   Cognition    Cognition / Consciousness WDL   Level of Consciousness Alert   Comments pleasant and receptive to edu   Sensation Upper Body   Upper Extremity Sensation  X   Comments hypersensitivity still present B mid biceps>distal   Hand Strengthening   Hand Strengthening Right ;Left ;Right Pinch;Left Pinch;Right Thumb Opposition;Left Thumb Opposition   Comment provided hard resistance therapy sponge   Balance   Sitting Balance (Static) Fair +   Sitting Balance (Dynamic) Fair   Standing Balance (Static) Fair -   Standing Balance (Dynamic) Fair -   Weight Shift Sitting Fair   Weight Shift Standing Fair   Skilled Intervention Verbal Cuing;Sequencing   Comments no AD   Bed Mobility    Supine to Sit Supervised   Scooting Supervised   Skilled Intervention Verbal Cuing   Comments before therapist could instruct in log roll, pt sat up in bed. went over log roll in detail; notified PT and they will cover later in their session as well   Activities of Daily Living   Eating Supervision   Grooming Supervision;Standing  (wash " hands)   Skilled Intervention Verbal Cuing;Sequencing   Comments discussed UB/LB ADLs and compensatory strategies w/ spine precautions   How much help from another person does the patient currently need...   Putting on and taking off regular lower body clothing? 3   Bathing (including washing, rinsing, and drying)? 3   Toileting, which includes using a toilet, bedpan, or urinal? 3   Putting on and taking off regular upper body clothing? 3   Taking care of personal grooming such as brushing teeth? 3   Eating meals? 3   6 Clicks Daily Activity Score 18   Functional Mobility   Sit to Stand Supervised   Bed, Chair, Wheelchair Transfer Supervised   Mobility EOB>sink>Chair   Skilled Intervention Verbal Cuing;Sequencing   Comments no AD   ICU Target Mobility Level   ICU Mobility - Targeted Level Level 4   Patient / Family Goals   Patient / Family Goal #1 to get back to normal   Goal #1 Outcome Progressing as expected   Short Term Goals   Short Term Goal # 1 pt will demo toilet txf with supv   Goal Outcome # 1 Progressing as expected   Short Term Goal # 2 pt will dress LB with supv   Goal Outcome # 2 Progressing as expected   Short Term Goal # 3 pt will groom in stance at the sink with supv   Goal Outcome # 3 Goal met   Short Term Goal # 4 pt will dress UB including c-collar w/ supv   Goal Outcome # 4 Progressing as expected          unknown

## 2021-07-09 NOTE — ANESTHESIA TIME REPORT
Anesthesia Start and Stop Event Times     Date Time Event    7/8/2021 1827 Ready for Procedure     1837 Anesthesia Start     2012 Anesthesia Stop        Responsible Staff  07/08/21    Name Role Begin End    Hardik Muhammad M.D. Anesth 1837 2012        Preop Diagnosis (Free Text):  Pre-op Diagnosis     neck and low back pain        Preop Diagnosis (Codes):    Post op Diagnosis  Cervical spinal stenosis      Premium Reason  A. 3PM - 7AM    Comments: emergency, tamara

## 2021-07-09 NOTE — ANESTHESIA PROCEDURE NOTES
Airway    Date/Time: 7/8/2021 6:45 PM  Performed by: Hardik Muhammad M.D.  Authorized by: Hardik Muhammad M.D.     Location:  OR  Urgency:  Elective  Indications for Airway Management:  Anesthesia      Spontaneous Ventilation: absent    Sedation Level:  Deep  Preoxygenated: Yes    Patient Position:  Sniffing  Mask Difficulty Assessment:  2 - vent by mask + OA or adjuvant +/- NMBA  Final Airway Type:  Endotracheal airway  Final Endotracheal Airway:  ETT  Cuffed: Yes    Technique Used for Successful ETT Placement:  Video laryngoscopy    Insertion Site:  Oral  Blade Type:  Glide  Laryngoscope Blade/Videolaryngoscope Blade Size:  4  ETT Size (mm):  7.0  Measured from:  Teeth  ETT to Teeth (cm):  24  Placement Verified by: auscultation and capnometry    Cormack-Lehane Classification:  Grade I - full view of glottis  Number of Attempts at Approach:  1   Atraumatic DLx1 with glidescope, intubated without neck extension.
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
## 2021-07-09 NOTE — PROGRESS NOTES
Pt arrived back from PACU via PACU RN.    2 RN skin assessment:    No skin concerns.  Surgical incision anterior neck, covered with guaze and tegaderm.        HR: 71  BP: 145/83  SpO2: 97 on 2L NC  RR: 22

## 2021-07-09 NOTE — CARE PLAN
The patient is Stable - Low risk of patient condition declining or worsening    Shift Goals  Clinical Goals: BP parameters; pain control  Patient Goals: pain control; comfort  Family Goals: no family at bedside    Progress made toward(s) clinical / shift goals:    Problem: Skin Integrity  Goal: Skin integrity is maintained or improved  Outcome: Progressing     Problem: Fall Risk  Goal: Patient will remain free from falls  Outcome: Progressing     Problem: Hemodynamics  Goal: Patient's hemodynamics, fluid balance and neurologic status will be stable or improve  Outcome: Progressing  Note: Patient updated on plan to monitor blood pressures more frequently to ensure BP maintains within the physician's given parameters.     Problem: Respiratory  Goal: Patient will achieve/maintain optimum respiratory ventilation and gas exchange  Outcome: Progressing       Patient is not progressing towards the following goals:      Problem: Pain - Standard  Goal: Alleviation of pain or a reduction in pain to the patient’s comfort goal  Outcome: Not Progressing  Note: Patient complains of pain in neck, back and bilateral arms. Patient updated on plan to continue using pharmacological and non-pharmacological interventions for pain management. Patient aware of plan to discuss medication dosages with MD in the morning.

## 2021-07-09 NOTE — PROGRESS NOTES
Neurosurgery Progress Note    Subjective:  Pt sitting at eob, eating & talking on phone. Remains with dysethesia in hangs C6 distribution bilat & lue weakness. Sore throat but takes po, posterior neck discomfort. OOB. MRI to be done this am    Exam:  AAO, collar on, dressing c&d, voice nml, trachea ml, good rom strong except left fe/ grasp 5-.    BP  Min: 123/78  Max: 159/91  Pulse  Av  Min: 57  Max: 106  Resp  Avg: 15.5  Min: 9  Max: 33  Temp  Av.5 °C (97.7 °F)  Min: 36.1 °C (97 °F)  Max: 36.9 °C (98.5 °F)  SpO2  Av %  Min: 92 %  Max: 97 %    No data recorded    Recent Labs     21  0400   WBC 7.8   RBC 4.77   HEMOGLOBIN 15.0   HEMATOCRIT 42.5   MCV 89.1   MCH 31.4   MCHC 35.3   RDW 38.8   PLATELETCT 250   MPV 9.1     Recent Labs     21  0400   SODIUM 139   POTASSIUM 4.3   CHLORIDE 104   CO2 26   GLUCOSE 106*   BUN 13   CREATININE 0.74   CALCIUM 8.8         Recent Labs     21  1152   REACTMIN 5.2   CLOTKINET 2.2   CLOTANGL 61.3   MAXCLOTS 57.1   EZK24DMK 0.0   PRCINADP 13.4   PRCINAA 0.0       Intake/Output                       21 07 - 21 0659 21 07 - 07/10/21 0659     1900-0659 Total 1900-0659 Total                 Intake    P.O.  --  800 800  --  -- --    P.O. -- 800 800 -- -- --    I.V.  --  1100 1100  --  -- --    Volume (mL) (lactated ringers infusion) -- 1100 1100 -- -- --    IV Piggyback  --  100 100  --  -- --    Volume (mL) (ceFAZolin in dextrose (ANCEF) IVPB premix 2 g) -- 100 100 -- -- --    Total Intake --  2000 -- -- --       Output    Urine  1600  2100 3700  475  -- 475    Number of Times Voided -- 4 x 4 x -- -- --    Urine Void (mL) 1600 2100 3700 475 -- 475    Stool  --  -- --  --  -- --    Number of Times Stooled -- 0 x 0 x -- -- --    Blood  --  50 50  --  -- --    Est. Blood Loss -- 50 50 -- -- --    Total Output 1600 2150 3750 475 -- 475       Net I/O     -1600 -150 -1750 -475 -- -475            Intake/Output Summary  (Last 24 hours) at 7/9/2021 0908  Last data filed at 7/9/2021 0800  Gross per 24 hour   Intake 2000 ml   Output 3825 ml   Net -1825 ml            • oxyCODONE-acetaminophen  1 tablet Q3HRS PRN   • gabapentin  600 mg TID   • LR   Continuous   • Pharmacy Consult Request  1 Each PHARMACY TO DOSE   • MD ALERT...DO NOT ADMINISTER NSAIDS or ASPIRIN unless ORDERED By Neurosurgery  1 Each PRN   • docusate sodium  100 mg BID   • senna-docusate  1 tablet Nightly   • senna-docusate  1 tablet Q24HRS PRN   • polyethylene glycol/lytes  1 Packet BID PRN   • magnesium hydroxide  30 mL QDAY PRN   • bisacodyl  10 mg Q24HRS PRN   • fleet  1 Each Once PRN   • 0.9 % NaCl with KCl 20 mEq 1,000 mL   Continuous   • oxyCODONE immediate-release  5 mg Q3HRS PRN    Or   • HYDROmorphone  0.25 mg Q3HRS PRN   • ceFAZolin  2 g Q8HR   • diphenhydrAMINE  25 mg Q6HRS PRN    Or   • diphenhydrAMINE  25 mg Q6HRS PRN   • ondansetron  4 mg Q4HRS PRN   • ondansetron  4 mg Q4HRS PRN   • promethazine  12.5-25 mg Q4HRS PRN   • promethazine  12.5-25 mg Q4HRS PRN   • prochlorperazine  5-10 mg Q4HRS PRN   • tizanidine  2 mg TID PRN   • hydrALAZINE  10 mg Q HOUR PRN   • benzocaine-menthol  1 Lozenge Q2HRS PRN   • calcium carbonate  500 mg BID   • dexamethasone  4 mg Q6HRS   • midodrine  10 mg Q8HRS   • Respiratory Therapy Consult   Continuous RT   • metaxalone  800 mg TID   • acetaminophen  1,000 mg Q6HRS    Followed by   • [START ON 7/10/2021] acetaminophen  1,000 mg Q6HRS PRN       Assessment and Plan:  POD # 1 ACDF C6-7  NM as above  MRI L spine to be done & then pt may dc. I did speak to Scot Mora, she will do dc meds  Prophylactic anticoagulation: no         Start date/time: no need    DC Instructions:  Ok to shower & allow incision to become wet 7/10/21 - remove dressing & leave open to air   No Aspirin or non-steroidal anti-inflammatory medications (aleve, motrin, ibuprofen, celebrex)  No driving for 2 weeks/ No driving while on narcotic  medication  Over the counter stool softeners daily while on narcotics  No lifting greater than 15 pounds, no repetitive motion above shoulder level  Follow up at [facility] 2 weeks after surgery

## 2021-07-11 PROBLEM — Z75.8 DISCHARGE PLANNING ISSUES: Status: RESOLVED | Noted: 2021-07-06 | Resolved: 2021-07-11

## 2021-07-11 PROBLEM — Z02.9 DISCHARGE PLANNING ISSUES: Status: RESOLVED | Noted: 2021-07-06 | Resolved: 2021-07-11

## 2021-07-11 PROBLEM — Z53.09 CONTRAINDICATION TO DEEP VEIN THROMBOSIS (DVT) PROPHYLAXIS: Status: RESOLVED | Noted: 2021-07-05 | Resolved: 2021-07-11

## 2021-08-10 ENCOUNTER — HOSPITAL ENCOUNTER (OUTPATIENT)
Dept: HOSPITAL 8 - STAR | Age: 51
Discharge: HOME | End: 2021-08-10
Attending: NEUROLOGICAL SURGERY
Payer: COMMERCIAL

## 2021-08-10 DIAGNOSIS — Z01.812: Primary | ICD-10-CM

## 2021-08-10 DIAGNOSIS — Z20.822: ICD-10-CM

## 2021-08-10 DIAGNOSIS — M48.061: ICD-10-CM

## 2021-08-10 LAB
ANION GAP SERPL CALC-SCNC: 4 MMOL/L (ref 5–15)
APTT BLD: 27 SECONDS (ref 25–31)
BASOPHILS # BLD AUTO: 0.1 X10^3/UL (ref 0–0.1)
BASOPHILS NFR BLD AUTO: 1 % (ref 0–1)
CALCIUM SERPL-MCNC: 9.6 MG/DL (ref 8.5–10.1)
CHLORIDE SERPL-SCNC: 106 MMOL/L (ref 98–107)
CREAT SERPL-MCNC: 0.86 MG/DL (ref 0.7–1.3)
EOSINOPHIL # BLD AUTO: 0.3 X10^3/UL (ref 0–0.4)
EOSINOPHIL NFR BLD AUTO: 5 % (ref 1–7)
ERYTHROCYTE [DISTWIDTH] IN BLOOD BY AUTOMATED COUNT: 12.7 % (ref 9.4–14.8)
INR PPP: 0.96 (ref 0.93–1.1)
LYMPHOCYTES # BLD AUTO: 2.5 X10^3/UL (ref 1–3.4)
LYMPHOCYTES NFR BLD AUTO: 44 % (ref 22–44)
MCH RBC QN AUTO: 31.3 PG (ref 27.5–34.5)
MCHC RBC AUTO-ENTMCNC: 34.8 G/DL (ref 33.2–36.2)
MICROSCOPIC: (no result)
MONOCYTES # BLD AUTO: 0.5 X10^3/UL (ref 0.2–0.8)
MONOCYTES NFR BLD AUTO: 8 % (ref 2–9)
NEUTROPHILS # BLD AUTO: 2.3 X10^3/UL (ref 1.8–6.8)
NEUTROPHILS NFR BLD AUTO: 42 % (ref 42–75)
PLATELET # BLD AUTO: 247 X10^3/UL (ref 130–400)
PMV BLD AUTO: 7 FL (ref 7.4–10.4)
PROTHROMBIN TIME: 10.3 SECONDS (ref 9.6–11.5)
RBC # BLD AUTO: 4.76 X10^6/UL (ref 4.38–5.82)

## 2021-08-10 PROCEDURE — 85730 THROMBOPLASTIN TIME PARTIAL: CPT

## 2021-08-10 PROCEDURE — 85610 PROTHROMBIN TIME: CPT

## 2021-08-10 PROCEDURE — 80048 BASIC METABOLIC PNL TOTAL CA: CPT

## 2021-08-10 PROCEDURE — 81003 URINALYSIS AUTO W/O SCOPE: CPT

## 2021-08-10 PROCEDURE — U0003 INFECTIOUS AGENT DETECTION BY NUCLEIC ACID (DNA OR RNA); SEVERE ACUTE RESPIRATORY SYNDROME CORONAVIRUS 2 (SARS-COV-2) (CORONAVIRUS DISEASE [COVID-19]), AMPLIFIED PROBE TECHNIQUE, MAKING USE OF HIGH THROUGHPUT TECHNOLOGIES AS DESCRIBED BY CMS-2020-01-R: HCPCS

## 2021-08-10 PROCEDURE — 36415 COLL VENOUS BLD VENIPUNCTURE: CPT

## 2021-08-10 PROCEDURE — 85025 COMPLETE CBC W/AUTO DIFF WBC: CPT

## 2021-08-13 ENCOUNTER — HOSPITAL ENCOUNTER (INPATIENT)
Dept: HOSPITAL 8 - OUT | Age: 51
LOS: 1 days | Discharge: HOME | DRG: 517 | End: 2021-08-14
Attending: NEUROLOGICAL SURGERY | Admitting: NEUROLOGICAL SURGERY
Payer: COMMERCIAL

## 2021-08-13 VITALS — SYSTOLIC BLOOD PRESSURE: 108 MMHG | DIASTOLIC BLOOD PRESSURE: 69 MMHG

## 2021-08-13 VITALS — BODY MASS INDEX: 25.37 KG/M2 | WEIGHT: 208.34 LBS | HEIGHT: 76 IN

## 2021-08-13 DIAGNOSIS — Z87.891: ICD-10-CM

## 2021-08-13 DIAGNOSIS — Z88.0: ICD-10-CM

## 2021-08-13 DIAGNOSIS — M48.061: Primary | ICD-10-CM

## 2021-08-13 DIAGNOSIS — Z88.5: ICD-10-CM

## 2021-08-13 PROCEDURE — 01NR0ZZ RELEASE SACRAL NERVE, OPEN APPROACH: ICD-10-PCS | Performed by: NEUROLOGICAL SURGERY

## 2021-08-13 PROCEDURE — 01NB0ZZ RELEASE LUMBAR NERVE, OPEN APPROACH: ICD-10-PCS | Performed by: NEUROLOGICAL SURGERY

## 2021-08-13 PROCEDURE — 72100 X-RAY EXAM L-S SPINE 2/3 VWS: CPT

## 2021-08-13 RX ADMIN — TIZANIDINE SCH MG: 4 TABLET ORAL at 21:33

## 2021-08-13 RX ADMIN — TIZANIDINE SCH MG: 4 TABLET ORAL at 13:11

## 2021-08-13 RX ADMIN — OXYCODONE HYDROCHLORIDE PRN MG: 5 SOLUTION ORAL at 17:14

## 2021-08-13 RX ADMIN — FENTANYL CITRATE PRN MCG: 50 INJECTION INTRAMUSCULAR; INTRAVENOUS at 11:05

## 2021-08-13 RX ADMIN — OXYCODONE HYDROCHLORIDE PRN MG: 5 SOLUTION ORAL at 11:15

## 2021-08-13 RX ADMIN — SODIUM CHLORIDE AND POTASSIUM CHLORIDE SCH MLS/HR: .9; .15 SOLUTION INTRAVENOUS at 18:30

## 2021-08-13 RX ADMIN — CEFAZOLIN SODIUM SCH MLS/HR: 1 SOLUTION INTRAVENOUS at 20:13

## 2021-08-13 RX ADMIN — FENTANYL CITRATE PRN MCG: 50 INJECTION INTRAMUSCULAR; INTRAVENOUS at 11:10

## 2021-08-13 RX ADMIN — FENTANYL CITRATE PRN MCG: 50 INJECTION INTRAMUSCULAR; INTRAVENOUS at 11:25

## 2021-08-13 RX ADMIN — HYDROCODONE BITARTRATE AND ACETAMINOPHEN PRN TAB: 10; 325 TABLET ORAL at 21:35

## 2021-08-13 RX ADMIN — FENTANYL CITRATE PRN MCG: 50 INJECTION INTRAMUSCULAR; INTRAVENOUS at 11:30

## 2021-08-14 VITALS — SYSTOLIC BLOOD PRESSURE: 98 MMHG | DIASTOLIC BLOOD PRESSURE: 56 MMHG

## 2021-08-14 VITALS — SYSTOLIC BLOOD PRESSURE: 116 MMHG | DIASTOLIC BLOOD PRESSURE: 71 MMHG

## 2021-08-14 VITALS — SYSTOLIC BLOOD PRESSURE: 112 MMHG | DIASTOLIC BLOOD PRESSURE: 63 MMHG

## 2021-08-14 RX ADMIN — TIZANIDINE SCH MG: 4 TABLET ORAL at 12:18

## 2021-08-14 RX ADMIN — HYDROCODONE BITARTRATE AND ACETAMINOPHEN PRN TAB: 10; 325 TABLET ORAL at 03:45

## 2021-08-14 RX ADMIN — SODIUM CHLORIDE AND POTASSIUM CHLORIDE SCH MLS/HR: .9; .15 SOLUTION INTRAVENOUS at 04:22

## 2021-08-14 RX ADMIN — HYDROCODONE BITARTRATE AND ACETAMINOPHEN PRN TAB: 10; 325 TABLET ORAL at 12:19

## 2021-08-14 RX ADMIN — CEFAZOLIN SODIUM SCH MLS/HR: 1 SOLUTION INTRAVENOUS at 04:10

## 2021-08-14 RX ADMIN — HYDROCODONE BITARTRATE AND ACETAMINOPHEN PRN TAB: 10; 325 TABLET ORAL at 08:07

## 2021-08-14 RX ADMIN — TIZANIDINE SCH MG: 4 TABLET ORAL at 05:06

## 2021-08-26 ENCOUNTER — HOSPITAL ENCOUNTER (OUTPATIENT)
Dept: RADIOLOGY | Facility: MEDICAL CENTER | Age: 51
End: 2021-08-26
Attending: NURSE PRACTITIONER
Payer: COMMERCIAL

## 2021-08-26 DIAGNOSIS — M50.30 DEGENERATION OF CERVICAL INTERVERTEBRAL DISC: ICD-10-CM

## 2021-08-26 DIAGNOSIS — M54.16 LUMBAR RADICULOPATHY: ICD-10-CM

## 2021-08-26 PROCEDURE — 72110 X-RAY EXAM L-2 SPINE 4/>VWS: CPT

## 2021-08-26 PROCEDURE — 72040 X-RAY EXAM NECK SPINE 2-3 VW: CPT

## 2022-08-17 ENCOUNTER — OFFICE VISIT (OUTPATIENT)
Dept: MEDICAL GROUP | Facility: CLINIC | Age: 52
End: 2022-08-17
Payer: COMMERCIAL

## 2022-08-17 VITALS — BODY MASS INDEX: 27.14 KG/M2 | WEIGHT: 223 LBS

## 2022-08-17 DIAGNOSIS — L57.0 ACTINIC KERATOSES: ICD-10-CM

## 2022-08-17 PROCEDURE — 17003 DESTRUCT PREMALG LES 2-14: CPT | Performed by: FAMILY MEDICINE

## 2022-08-17 PROCEDURE — 17000 DESTRUCT PREMALG LESION: CPT | Performed by: FAMILY MEDICINE

## 2022-08-17 RX ORDER — COVID-19 MOLECULAR TEST ASSAY
KIT MISCELLANEOUS
COMMUNITY
Start: 2022-06-29 | End: 2022-08-17

## 2022-08-17 ASSESSMENT — FIBROSIS 4 INDEX: FIB4 SCORE: 1.1

## 2022-08-17 NOTE — PROGRESS NOTES
Subjective:     CC: ***    HPI:   Osmani presents today with ***      Social Hx:    No problems updated.    Current Outpatient Medications Ordered in Epic   Medication Sig Dispense Refill    acetaminophen (TYLENOL) 500 MG Tab Take 2 Tablets by mouth every 6 hours. 30 tablet 0    methylPREDNISolone (MEDROL DOSEPAK) 4 MG Tablet Therapy Pack Follow schedule on package instructions. 21 tablet 0    oxycodone-acetaminophen (PERCOCET) 5-325 MG TABS Take 1-2 Tabs by mouth every four hours as needed (pain). 14 Tab 0    cyclobenzaprine (FLEXERIL) 10 MG TABS Take 1 Tab by mouth 3 times a day as needed for Muscle Spasms. 16 Tab 0    ibuprofen (MOTRIN) 800 MG TABS Take 1 Tab by mouth every 8 hours as needed for Mild Pain. 30 Tab 0    MILK THISTLE by Does not apply route 4 times a day.      L-FORMULA LYSINE HCL PO Take 1-2 Tabs by mouth every day.       No current [facility]-ordered facility-administered medications on file.       Past Medical History:   Diagnosis Date    Hx of cold sores     oral    Staphylococcus infection in conditions classified elsewhere and of unspecified site 2011    hand, face        Past Surgical History:   Procedure Laterality Date    CERVICAL DISK AND FUSION ANTERIOR N/A 7/8/2021    Procedure: CERVICAL 4-5 ANTERIOR CERVICAL DISCECTOMY WITH FUSION, MINIMALLY INVASIVE;  Surgeon: Harvey Renner M.D.;  Location: [facility];  Service: Neurosurgery    CERVICAL DISK AND FUSION ANTERIOR  3/13/2014    Performed by Harvey Renner M.D. at [facility] ORS        No family history on file.       ROS:  Gen: no fevers/chills, no weight loss  Pulm: no sob, no cough  CV: no chest pain, no palpitations  GI: no nausea/vomiting, no diarrhea  : no dysuria  MSk: no myalgias  Skin: no rash  Neuro: no headaches, no weakness      Objective:     Exam:  There were no vitals taken for this visit. There is no height or weight on file to calculate BMI.    Gen: Alert and oriented, No apparent distress.  Head:  NCAT, EOMI,  sclera clear without discharge  Neck: Neck is supple without lymphadenopathy.  Lungs: Normal effort, CTA bilaterally, no wheezes, rhonchi, or rales  CV: Regular rate and rhythm. No murmurs, rubs, or gallops.  Abd:   Non-distended, soft  Ext: No clubbing, cyanosis, edema.  MSK: Unassisted gait  Derm: No lesions on exposed skin  Psych: normal mood and affect        Assessment & Plan:     52 y.o. male with the following -     Problem List Items Addressed This Visit    None      Follow-up: ***

## 2022-08-17 NOTE — PROGRESS NOTES
"Subjective:     CC: Sores on head    HPI:   Sores on head:  Osmani presents today complaining of sores on head, recurrent.  When he is most concerned about has been there for \"the last couple of months\", less than 3 months.  He states he often nicks his head while shaving or bumps his head and the sores are places that have not healed.  He has previously seen Dr. Ogden for this and he had the sores frozen off and they do not return.  He states this area of his head has gotten sun exposure in the past but recently he has been wearing a hat or helmet at work.  Patient denies any history of skin cancer in his himself or family.  Patient has no history of cancer.    Social Hx:  No current alcohol or drug use   he has \"been clean for the last 10 years\"      Problem   Actinic Keratoses       Current Outpatient Medications Ordered in Epic   Medication Sig Dispense Refill    cyclobenzaprine (FLEXERIL) 10 MG TABS Take 1 Tab by mouth 3 times a day as needed for Muscle Spasms. 16 Tab 0     No current Epic-ordered facility-administered medications on file.       Past Medical History:   Diagnosis Date    Hx of cold sores     oral    Staphylococcus infection in conditions classified elsewhere and of unspecified site 2011    hand, face        Past Surgical History:   Procedure Laterality Date    CERVICAL DISK AND FUSION ANTERIOR N/A 7/8/2021    Procedure: CERVICAL 4-5 ANTERIOR CERVICAL DISCECTOMY WITH FUSION, MINIMALLY INVASIVE;  Surgeon: Harvey Renner M.D.;  Location: [facility];  Service: Neurosurgery    CERVICAL DISK AND FUSION ANTERIOR  3/13/2014    Performed by Harvey Renner M.D. at [facility] ORS        No family history on file.       ROS:  Pulm: no sob, no cough  CV: no chest pain, no palpitations  MSk: + Chronic back and neck pain.    Skin: Multiple \"sores\" on head.      Objective:     Exam:  Pulse (!) (P) 53   Temp (P) 36.1 °C (97 °F) (Temporal)   Resp (P) 16   Ht (P) 1.93 m (6' 4\")   Wt 101 kg (223 " lb)   SpO2 (P) 95%   BMI (P) 27.14 kg/m²  Body mass index is 27.14 kg/m² (pended).    Gen: Alert and oriented, No apparent distress.  Head:  NCAT, EOMI, sclera clear without discharge  Neck: Neck is supple without lymphadenopathy.  Lungs: Normal effort, CTA bilaterally, no wheezes, rhonchi, or rales  CV: Regular rate and rhythm. No murmurs, rubs, or gallops.  MSK: Unassisted gait  Derm: Scalp: Multiple dry, slightly hyperpigmented, flat 1 cm or smaller lesions on scalp.  No other lesions on exposed skin  Psych: normal mood and affect        Assessment & Plan:     52 y.o. male with the following -     Problem List Items Addressed This Visit       Actinic keratoses     Located on scalp, multiple  2 largest lesions are approximately 1 cm, 2 other notable lesions approximately half a centimeter.  Destroyed with liquid nitrogen today, 8/17/2022  Patient may follow-up if these do not heal adequately or more appear.            Follow-up: As needed for nonhealing sores, new sores or other issues.

## 2022-08-17 NOTE — ASSESSMENT & PLAN NOTE
Located on scalp, multiple  2 largest lesions are approximately 1 cm, 2 other notable lesions approximately half a centimeter.  Destroyed with liquid nitrogen today, 8/17/2022  Patient may follow-up if these do not heal adequately or more appear.

## 2023-02-08 NOTE — THERAPY
Missed Therapy     Patient Name: Osmani Fitzpatrick  Age:  51 y.o., Sex:  male  Medical Record #: [id number]  Today's Date: 7/8/2021    Discussed missed therapy with OT       07/08/21 1031   Interdisciplinary Plan of Care Collaboration   IDT Collaboration with  Occupational Therapist   Collaboration Comments hold, surgery today   Session Information   Date / Session Number  7/8-hold, surgery today.       Azelaic Acid Pregnancy And Lactation Text: This medication is considered safe during pregnancy and breast feeding. Topical Clindamycin Counseling: Patient counseled that this medication may cause skin irritation or allergic reactions. In the event of skin irritation, the patient was advised to reduce the amount of the drug applied or use it less frequently. The patient verbalized understanding of the proper use and possible adverse effects of clindamycin. All of the patient's questions and concerns were addressed. Spironolactone Counseling: Patient advised regarding risks of diarrhea, abdominal pain, hyperkalemia, birth defects (for female patients), liver toxicity and renal toxicity. The patient may need blood work to monitor liver and kidney function and potassium levels while on therapy. The patient verbalized understanding of the proper use and possible adverse effects of spironolactone. All of the patient's questions and concerns were addressed. Birth Control Pills Counseling: Birth Control Pill Counseling: I discussed with the patient the potential side effects of OCPs including but not limited to increased risk of stroke, heart attack, thrombophlebitis, deep venous thrombosis, hepatic adenomas, breast changes, GI upset, headaches, and depression. The patient verbalized understanding of the proper use and possible adverse effects of OCPs. All of the patient's questions and concerns were addressed. Erythromycin Pregnancy And Lactation Text: This medication is Pregnancy Category B and is considered safe during pregnancy. It is also excreted in breast milk. Use Enhanced Medication Counseling?: No Sarecycline Counseling: Patient advised regarding possible photosensitivity and discoloration of the teeth, skin, lips, tongue and gums. Patient instructed to avoid sunlight, if possible. When exposed to sunlight, patients should wear protective clothing, sunglasses, and sunscreen. The patient was instructed to call the office immediately if the following severe adverse effects occur:  hearing changes, easy bruising/bleeding, severe headache, or vision changes. The patient verbalized understanding of the proper use and possible adverse effects of sarecycline. All of the patient's questions and concerns were addressed. Aklief Pregnancy And Lactation Text: It is unknown if this medication is safe to use during pregnancy. It is unknown if this medication is excreted in breast milk. Breastfeeding women should use the topical cream on the smallest area of the skin for the shortest time needed while breastfeeding. Do not apply to nipple and areola. Bactrim Pregnancy And Lactation Text: This medication is Pregnancy Category D and is known to cause fetal risk. It is also excreted in breast milk. Topical Retinoid Pregnancy And Lactation Text: This medication is Pregnancy Category C. It is unknown if this medication is excreted in breast milk. Aklief counseling:  Patient advised to apply a pea-sized amount only at bedtime and wait 30 minutes after washing their face before applying. If too drying, patient may add a non-comedogenic moisturizer. The most commonly reported side effects including irritation, redness, scaling, dryness, stinging, burning, itching, and increased risk of sunburn. The patient verbalized understanding of the proper use and possible adverse effects of retinoids. All of the patient's questions and concerns were addressed. Winlevi Counseling:  I discussed with the patient the risks of topical clascoterone including but not limited to erythema, scaling, itching, and stinging. Patient voiced their understanding. Azithromycin Pregnancy And Lactation Text: This medication is considered safe during pregnancy and is also secreted in breast milk. Doxycycline Counseling:  Patient counseled regarding possible photosensitivity and increased risk for sunburn. Patient instructed to avoid sunlight, if possible. When exposed to sunlight, patients should wear protective clothing, sunglasses, and sunscreen. The patient was instructed to call the office immediately if the following severe adverse effects occur:  hearing changes, easy bruising/bleeding, severe headache, or vision changes. The patient verbalized understanding of the proper use and possible adverse effects of doxycycline. All of the patient's questions and concerns were addressed. High Dose Vitamin A Pregnancy And Lactation Text: High dose vitamin A therapy is contraindicated during pregnancy and breast feeding. Detail Level: Zone Dapsone Counseling: I discussed with the patient the risks of dapsone including but not limited to hemolytic anemia, agranulocytosis, rashes, methemoglobinemia, kidney failure, peripheral neuropathy, headaches, GI upset, and liver toxicity. Patients who start dapsone require monitoring including baseline LFTs and weekly CBCs for the first month, then every month thereafter. The patient verbalized understanding of the proper use and possible adverse effects of dapsone. All of the patient's questions and concerns were addressed. Isotretinoin Pregnancy And Lactation Text: This medication is Pregnancy Category X and is considered extremely dangerous during pregnancy. It is unknown if it is excreted in breast milk. Benzoyl Peroxide Pregnancy And Lactation Text: This medication is Pregnancy Category C. It is unknown if benzoyl peroxide is excreted in breast milk. Topical Sulfur Applications Counseling: Topical Sulfur Counseling: Patient counseled that this medication may cause skin irritation or allergic reactions. In the event of skin irritation, the patient was advised to reduce the amount of the drug applied or use it less frequently. The patient verbalized understanding of the proper use and possible adverse effects of topical sulfur application. All of the patient's questions and concerns were addressed. Tetracycline Counseling: Patient counseled regarding possible photosensitivity and increased risk for sunburn. Patient instructed to avoid sunlight, if possible. When exposed to sunlight, patients should wear protective clothing, sunglasses, and sunscreen. The patient was instructed to call the office immediately if the following severe adverse effects occur:  hearing changes, easy bruising/bleeding, severe headache, or vision changes. The patient verbalized understanding of the proper use and possible adverse effects of tetracycline. All of the patient's questions and concerns were addressed. Patient understands to avoid pregnancy while on therapy due to potential birth defects. Spironolactone Pregnancy And Lactation Text: This medication can cause feminization of the male fetus and should be avoided during pregnancy. The active metabolite is also found in breast milk. High Dose Vitamin A Counseling: Side effects reviewed, pt to contact office should one occur. Benzoyl Peroxide Counseling: Patient counseled that medicine may cause skin irritation and bleach clothing. In the event of skin irritation, the patient was advised to reduce the amount of the drug applied or use it less frequently. The patient verbalized understanding of the proper use and possible adverse effects of benzoyl peroxide. All of the patient's questions and concerns were addressed. Topical Clindamycin Pregnancy And Lactation Text: This medication is Pregnancy Category B and is considered safe during pregnancy. It is unknown if it is excreted in breast milk. Sarecycline Pregnancy And Lactation Text: This medication is Pregnancy Category D and not consider safe during pregnancy. It is also excreted in breast milk. Isotretinoin Counseling: Patient should get monthly blood tests, not donate blood, not drive at night if vision affected, not share medication, and not undergo elective surgery for 6 months after tx completed. Side effects reviewed, pt to contact office should one occur. Birth Control Pills Pregnancy And Lactation Text: This medication should be avoided if pregnant and for the first 30 days post-partum. Erythromycin Counseling:  I discussed with the patient the risks of erythromycin including but not limited to GI upset, allergic reaction, drug rash, diarrhea, increase in liver enzymes, and yeast infections. Azelaic Acid Counseling: Patient counseled that medicine may cause skin irritation and to avoid applying near the eyes. In the event of skin irritation, the patient was advised to reduce the amount of the drug applied or use it less frequently. The patient verbalized understanding of the proper use and possible adverse effects of azelaic acid. All of the patient's questions and concerns were addressed. Tazorac Pregnancy And Lactation Text: This medication is not safe during pregnancy. It is unknown if this medication is excreted in breast milk. Tazorac Counseling:  Patient advised that medication is irritating and drying. Patient may need to apply sparingly and wash off after an hour before eventually leaving it on overnight. The patient verbalized understanding of the proper use and possible adverse effects of tazorac. All of the patient's questions and concerns were addressed. Winlevi Pregnancy And Lactation Text: This medication is considered safe during pregnancy and breastfeeding. Topical Retinoid counseling:  Patient advised to apply a pea-sized amount only at bedtime and wait 30 minutes after washing their face before applying. If too drying, patient may add a non-comedogenic moisturizer. The patient verbalized understanding of the proper use and possible adverse effects of retinoids. All of the patient's questions and concerns were addressed. Topical Sulfur Applications Pregnancy And Lactation Text: This medication is Pregnancy Category C and has an unknown safety profile during pregnancy. It is unknown if this topical medication is excreted in breast milk. Bactrim Counseling:  I discussed with the patient the risks of sulfa antibiotics including but not limited to GI upset, allergic reaction, drug rash, diarrhea, dizziness, photosensitivity, and yeast infections. Rarely, more serious reactions can occur including but not limited to aplastic anemia, agranulocytosis, methemoglobinemia, blood dyscrasias, liver or kidney failure, lung infiltrates or desquamative/blistering drug rashes. Doxycycline Pregnancy And Lactation Text: This medication is Pregnancy Category D and not consider safe during pregnancy. It is also excreted in breast milk but is considered safe for shorter treatment courses. Minocycline Counseling: Patient advised regarding possible photosensitivity and discoloration of the teeth, skin, lips, tongue and gums. Patient instructed to avoid sunlight, if possible. When exposed to sunlight, patients should wear protective clothing, sunglasses, and sunscreen. The patient was instructed to call the office immediately if the following severe adverse effects occur:  hearing changes, easy bruising/bleeding, severe headache, or vision changes. The patient verbalized understanding of the proper use and possible adverse effects of minocycline. All of the patient's questions and concerns were addressed. Azithromycin Counseling:  I discussed with the patient the risks of azithromycin including but not limited to GI upset, allergic reaction, drug rash, diarrhea, and yeast infections. Dapsone Pregnancy And Lactation Text: This medication is Pregnancy Category C and is not considered safe during pregnancy or breast feeding. Detail Level: Simple

## 2023-02-28 ENCOUNTER — ANESTHESIA EVENT (OUTPATIENT)
Dept: SURGERY | Facility: MEDICAL CENTER | Age: 53
End: 2023-02-28
Payer: COMMERCIAL

## 2023-02-28 ENCOUNTER — HOSPITAL ENCOUNTER (EMERGENCY)
Facility: MEDICAL CENTER | Age: 53
End: 2023-02-28
Attending: EMERGENCY MEDICINE | Admitting: EMERGENCY MEDICINE
Payer: COMMERCIAL

## 2023-02-28 ENCOUNTER — ANESTHESIA (OUTPATIENT)
Dept: SURGERY | Facility: MEDICAL CENTER | Age: 53
End: 2023-02-28
Payer: COMMERCIAL

## 2023-02-28 ENCOUNTER — APPOINTMENT (OUTPATIENT)
Dept: RADIOLOGY | Facility: MEDICAL CENTER | Age: 53
End: 2023-02-28
Attending: EMERGENCY MEDICINE
Payer: COMMERCIAL

## 2023-02-28 VITALS
HEART RATE: 67 BPM | HEIGHT: 76 IN | SYSTOLIC BLOOD PRESSURE: 116 MMHG | BODY MASS INDEX: 26.34 KG/M2 | TEMPERATURE: 97.7 F | RESPIRATION RATE: 16 BRPM | OXYGEN SATURATION: 92 % | DIASTOLIC BLOOD PRESSURE: 83 MMHG | WEIGHT: 216.27 LBS

## 2023-02-28 DIAGNOSIS — G89.18 POST-OPERATIVE PAIN: ICD-10-CM

## 2023-02-28 LAB
ALBUMIN SERPL BCP-MCNC: 4.4 G/DL (ref 3.2–4.9)
ALBUMIN/GLOB SERPL: 1.3 G/DL
ALP SERPL-CCNC: 64 U/L (ref 30–99)
ALT SERPL-CCNC: 43 U/L (ref 2–50)
ANION GAP SERPL CALC-SCNC: 10 MMOL/L (ref 7–16)
APPEARANCE UR: CLEAR
AST SERPL-CCNC: 29 U/L (ref 12–45)
BASOPHILS # BLD AUTO: 0.5 % (ref 0–1.8)
BASOPHILS # BLD: 0.04 K/UL (ref 0–0.12)
BILIRUB SERPL-MCNC: 1 MG/DL (ref 0.1–1.5)
BILIRUB UR QL STRIP.AUTO: NEGATIVE
BUN SERPL-MCNC: 11 MG/DL (ref 8–22)
CALCIUM ALBUM COR SERPL-MCNC: 9.5 MG/DL (ref 8.5–10.5)
CALCIUM SERPL-MCNC: 9.8 MG/DL (ref 8.5–10.5)
CHLORIDE SERPL-SCNC: 101 MMOL/L (ref 96–112)
CO2 SERPL-SCNC: 26 MMOL/L (ref 20–33)
COLOR UR: YELLOW
CREAT SERPL-MCNC: 0.76 MG/DL (ref 0.5–1.4)
EOSINOPHIL # BLD AUTO: 0.17 K/UL (ref 0–0.51)
EOSINOPHIL NFR BLD: 2.1 % (ref 0–6.9)
ERYTHROCYTE [DISTWIDTH] IN BLOOD BY AUTOMATED COUNT: 38.4 FL (ref 35.9–50)
GFR SERPLBLD CREATININE-BSD FMLA CKD-EPI: 109 ML/MIN/1.73 M 2
GLOBULIN SER CALC-MCNC: 3.3 G/DL (ref 1.9–3.5)
GLUCOSE SERPL-MCNC: 104 MG/DL (ref 65–99)
GLUCOSE UR STRIP.AUTO-MCNC: NEGATIVE MG/DL
HCT VFR BLD AUTO: 47.3 % (ref 42–52)
HGB BLD-MCNC: 16.6 G/DL (ref 14–18)
IMM GRANULOCYTES # BLD AUTO: 0.02 K/UL (ref 0–0.11)
IMM GRANULOCYTES NFR BLD AUTO: 0.2 % (ref 0–0.9)
KETONES UR STRIP.AUTO-MCNC: NEGATIVE MG/DL
LEUKOCYTE ESTERASE UR QL STRIP.AUTO: NEGATIVE
LIPASE SERPL-CCNC: 18 U/L (ref 11–82)
LYMPHOCYTES # BLD AUTO: 1.69 K/UL (ref 1–4.8)
LYMPHOCYTES NFR BLD: 20.9 % (ref 22–41)
MCH RBC QN AUTO: 31.2 PG (ref 27–33)
MCHC RBC AUTO-ENTMCNC: 35.1 G/DL (ref 33.7–35.3)
MCV RBC AUTO: 88.9 FL (ref 81.4–97.8)
MICRO URNS: NORMAL
MONOCYTES # BLD AUTO: 0.65 K/UL (ref 0–0.85)
MONOCYTES NFR BLD AUTO: 8.1 % (ref 0–13.4)
NEUTROPHILS # BLD AUTO: 5.5 K/UL (ref 1.82–7.42)
NEUTROPHILS NFR BLD: 68.2 % (ref 44–72)
NITRITE UR QL STRIP.AUTO: NEGATIVE
NRBC # BLD AUTO: 0 K/UL
NRBC BLD-RTO: 0 /100 WBC
PH UR STRIP.AUTO: 7 [PH] (ref 5–8)
PLATELET # BLD AUTO: 260 K/UL (ref 164–446)
PMV BLD AUTO: 8.9 FL (ref 9–12.9)
POTASSIUM SERPL-SCNC: 4.5 MMOL/L (ref 3.6–5.5)
PROT SERPL-MCNC: 7.7 G/DL (ref 6–8.2)
PROT UR QL STRIP: NEGATIVE MG/DL
RBC # BLD AUTO: 5.32 M/UL (ref 4.7–6.1)
RBC UR QL AUTO: NEGATIVE
SODIUM SERPL-SCNC: 137 MMOL/L (ref 135–145)
SP GR UR STRIP.AUTO: 1.03
UROBILINOGEN UR STRIP.AUTO-MCNC: 1 MG/DL
WBC # BLD AUTO: 8.1 K/UL (ref 4.8–10.8)

## 2023-02-28 PROCEDURE — 00840 ANES IPER PX LOWER ABD NOS: CPT | Performed by: ANESTHESIOLOGY

## 2023-02-28 PROCEDURE — 160046 HCHG PACU - 1ST 60 MINS PHASE II: Performed by: SURGERY

## 2023-02-28 PROCEDURE — 44970 LAPAROSCOPY APPENDECTOMY: CPT | Performed by: SURGERY

## 2023-02-28 PROCEDURE — 99291 CRITICAL CARE FIRST HOUR: CPT

## 2023-02-28 PROCEDURE — 88304 TISSUE EXAM BY PATHOLOGIST: CPT

## 2023-02-28 PROCEDURE — 700105 HCHG RX REV CODE 258: Performed by: ANESTHESIOLOGY

## 2023-02-28 PROCEDURE — 700101 HCHG RX REV CODE 250: Performed by: ANESTHESIOLOGY

## 2023-02-28 PROCEDURE — A9270 NON-COVERED ITEM OR SERVICE: HCPCS | Performed by: ANESTHESIOLOGY

## 2023-02-28 PROCEDURE — 700102 HCHG RX REV CODE 250 W/ 637 OVERRIDE(OP): Performed by: ANESTHESIOLOGY

## 2023-02-28 PROCEDURE — 74177 CT ABD & PELVIS W/CONTRAST: CPT

## 2023-02-28 PROCEDURE — 700111 HCHG RX REV CODE 636 W/ 250 OVERRIDE (IP): Performed by: ANESTHESIOLOGY

## 2023-02-28 PROCEDURE — 36415 COLL VENOUS BLD VENIPUNCTURE: CPT

## 2023-02-28 PROCEDURE — 160009 HCHG ANES TIME/MIN: Performed by: SURGERY

## 2023-02-28 PROCEDURE — 160002 HCHG RECOVERY MINUTES (STAT): Performed by: SURGERY

## 2023-02-28 PROCEDURE — 700111 HCHG RX REV CODE 636 W/ 250 OVERRIDE (IP): Performed by: EMERGENCY MEDICINE

## 2023-02-28 PROCEDURE — 700101 HCHG RX REV CODE 250: Performed by: EMERGENCY MEDICINE

## 2023-02-28 PROCEDURE — 700105 HCHG RX REV CODE 258: Performed by: EMERGENCY MEDICINE

## 2023-02-28 PROCEDURE — 81003 URINALYSIS AUTO W/O SCOPE: CPT

## 2023-02-28 PROCEDURE — 80053 COMPREHEN METABOLIC PANEL: CPT

## 2023-02-28 PROCEDURE — 160041 HCHG SURGERY MINUTES - EA ADDL 1 MIN LEVEL 4: Performed by: SURGERY

## 2023-02-28 PROCEDURE — 83690 ASSAY OF LIPASE: CPT

## 2023-02-28 PROCEDURE — 96375 TX/PRO/DX INJ NEW DRUG ADDON: CPT | Mod: XU

## 2023-02-28 PROCEDURE — 160048 HCHG OR STATISTICAL LEVEL 1-5: Performed by: SURGERY

## 2023-02-28 PROCEDURE — 85025 COMPLETE CBC W/AUTO DIFF WBC: CPT

## 2023-02-28 PROCEDURE — 160035 HCHG PACU - 1ST 60 MINS PHASE I: Performed by: SURGERY

## 2023-02-28 PROCEDURE — 96374 THER/PROPH/DIAG INJ IV PUSH: CPT | Mod: XU

## 2023-02-28 PROCEDURE — 160025 RECOVERY II MINUTES (STATS): Performed by: SURGERY

## 2023-02-28 PROCEDURE — 160029 HCHG SURGERY MINUTES - 1ST 30 MINS LEVEL 4: Performed by: SURGERY

## 2023-02-28 PROCEDURE — 96376 TX/PRO/DX INJ SAME DRUG ADON: CPT

## 2023-02-28 PROCEDURE — 700117 HCHG RX CONTRAST REV CODE 255: Performed by: EMERGENCY MEDICINE

## 2023-02-28 PROCEDURE — 160036 HCHG PACU - EA ADDL 30 MINS PHASE I: Performed by: SURGERY

## 2023-02-28 PROCEDURE — 99284 EMERGENCY DEPT VISIT MOD MDM: CPT | Mod: 57 | Performed by: SURGERY

## 2023-02-28 PROCEDURE — 700101 HCHG RX REV CODE 250: Performed by: SURGERY

## 2023-02-28 RX ORDER — IBUPROFEN 200 MG
200-800 TABLET ORAL EVERY 6 HOURS PRN
COMMUNITY

## 2023-02-28 RX ORDER — HALOPERIDOL 5 MG/ML
1 INJECTION INTRAMUSCULAR
Status: DISCONTINUED | OUTPATIENT
Start: 2023-02-28 | End: 2023-02-28 | Stop reason: HOSPADM

## 2023-02-28 RX ORDER — ONDANSETRON 2 MG/ML
INJECTION INTRAMUSCULAR; INTRAVENOUS PRN
Status: DISCONTINUED | OUTPATIENT
Start: 2023-02-28 | End: 2023-02-28 | Stop reason: SURG

## 2023-02-28 RX ORDER — SODIUM CHLORIDE, SODIUM LACTATE, POTASSIUM CHLORIDE, CALCIUM CHLORIDE 600; 310; 30; 20 MG/100ML; MG/100ML; MG/100ML; MG/100ML
INJECTION, SOLUTION INTRAVENOUS
Status: DISCONTINUED | OUTPATIENT
Start: 2023-02-28 | End: 2023-02-28 | Stop reason: SURG

## 2023-02-28 RX ORDER — HYDROMORPHONE HYDROCHLORIDE 1 MG/ML
0.2 INJECTION, SOLUTION INTRAMUSCULAR; INTRAVENOUS; SUBCUTANEOUS
Status: DISCONTINUED | OUTPATIENT
Start: 2023-02-28 | End: 2023-02-28 | Stop reason: HOSPADM

## 2023-02-28 RX ORDER — BUPIVACAINE HYDROCHLORIDE AND EPINEPHRINE 5; 5 MG/ML; UG/ML
INJECTION, SOLUTION EPIDURAL; INTRACAUDAL; PERINEURAL
Status: DISCONTINUED | OUTPATIENT
Start: 2023-02-28 | End: 2023-02-28 | Stop reason: HOSPADM

## 2023-02-28 RX ORDER — SODIUM CHLORIDE, SODIUM LACTATE, POTASSIUM CHLORIDE, CALCIUM CHLORIDE 600; 310; 30; 20 MG/100ML; MG/100ML; MG/100ML; MG/100ML
INJECTION, SOLUTION INTRAVENOUS CONTINUOUS
Status: DISCONTINUED | OUTPATIENT
Start: 2023-02-28 | End: 2023-02-28 | Stop reason: HOSPADM

## 2023-02-28 RX ORDER — CEFTRIAXONE 2 G/1
2000 INJECTION, POWDER, FOR SOLUTION INTRAMUSCULAR; INTRAVENOUS ONCE
Status: COMPLETED | OUTPATIENT
Start: 2023-02-28 | End: 2023-02-28

## 2023-02-28 RX ORDER — MEPERIDINE HYDROCHLORIDE 25 MG/ML
6.25 INJECTION INTRAMUSCULAR; INTRAVENOUS; SUBCUTANEOUS
Status: DISCONTINUED | OUTPATIENT
Start: 2023-02-28 | End: 2023-02-28 | Stop reason: HOSPADM

## 2023-02-28 RX ORDER — SODIUM CHLORIDE 9 MG/ML
1000 INJECTION, SOLUTION INTRAVENOUS ONCE
Status: COMPLETED | OUTPATIENT
Start: 2023-02-28 | End: 2023-02-28

## 2023-02-28 RX ORDER — ROCURONIUM BROMIDE 10 MG/ML
INJECTION, SOLUTION INTRAVENOUS PRN
Status: DISCONTINUED | OUTPATIENT
Start: 2023-02-28 | End: 2023-02-28 | Stop reason: SURG

## 2023-02-28 RX ORDER — OXYCODONE HCL 5 MG/5 ML
10 SOLUTION, ORAL ORAL
Status: COMPLETED | OUTPATIENT
Start: 2023-02-28 | End: 2023-02-28

## 2023-02-28 RX ORDER — METRONIDAZOLE 500 MG/100ML
500 INJECTION, SOLUTION INTRAVENOUS ONCE
Status: COMPLETED | OUTPATIENT
Start: 2023-02-28 | End: 2023-02-28

## 2023-02-28 RX ORDER — HYDROMORPHONE HYDROCHLORIDE 1 MG/ML
0.4 INJECTION, SOLUTION INTRAMUSCULAR; INTRAVENOUS; SUBCUTANEOUS
Status: DISCONTINUED | OUTPATIENT
Start: 2023-02-28 | End: 2023-02-28 | Stop reason: HOSPADM

## 2023-02-28 RX ORDER — LIDOCAINE HYDROCHLORIDE 40 MG/ML
SOLUTION TOPICAL PRN
Status: DISCONTINUED | OUTPATIENT
Start: 2023-02-28 | End: 2023-02-28 | Stop reason: SURG

## 2023-02-28 RX ORDER — DEXAMETHASONE SODIUM PHOSPHATE 4 MG/ML
INJECTION, SOLUTION INTRA-ARTICULAR; INTRALESIONAL; INTRAMUSCULAR; INTRAVENOUS; SOFT TISSUE PRN
Status: DISCONTINUED | OUTPATIENT
Start: 2023-02-28 | End: 2023-02-28 | Stop reason: SURG

## 2023-02-28 RX ORDER — HYDROMORPHONE HYDROCHLORIDE 1 MG/ML
0.1 INJECTION, SOLUTION INTRAMUSCULAR; INTRAVENOUS; SUBCUTANEOUS
Status: DISCONTINUED | OUTPATIENT
Start: 2023-02-28 | End: 2023-02-28 | Stop reason: HOSPADM

## 2023-02-28 RX ORDER — HYDROMORPHONE HYDROCHLORIDE 1 MG/ML
0.5 INJECTION, SOLUTION INTRAMUSCULAR; INTRAVENOUS; SUBCUTANEOUS ONCE
Status: COMPLETED | OUTPATIENT
Start: 2023-02-28 | End: 2023-02-28

## 2023-02-28 RX ORDER — OXYCODONE HYDROCHLORIDE 5 MG/1
5 TABLET ORAL EVERY 6 HOURS PRN
Qty: 28 TABLET | Refills: 0 | Status: SHIPPED | OUTPATIENT
Start: 2023-02-28 | End: 2023-03-07

## 2023-02-28 RX ORDER — OXYCODONE HCL 5 MG/5 ML
5 SOLUTION, ORAL ORAL
Status: COMPLETED | OUTPATIENT
Start: 2023-02-28 | End: 2023-02-28

## 2023-02-28 RX ORDER — DIPHENHYDRAMINE HYDROCHLORIDE 50 MG/ML
12.5 INJECTION INTRAMUSCULAR; INTRAVENOUS
Status: DISCONTINUED | OUTPATIENT
Start: 2023-02-28 | End: 2023-02-28 | Stop reason: HOSPADM

## 2023-02-28 RX ORDER — ONDANSETRON 2 MG/ML
4 INJECTION INTRAMUSCULAR; INTRAVENOUS
Status: DISCONTINUED | OUTPATIENT
Start: 2023-02-28 | End: 2023-02-28 | Stop reason: HOSPADM

## 2023-02-28 RX ADMIN — CEFTRIAXONE SODIUM 2000 MG: 2 INJECTION, POWDER, FOR SOLUTION INTRAMUSCULAR; INTRAVENOUS at 13:22

## 2023-02-28 RX ADMIN — FENTANYL CITRATE 150 MCG: 50 INJECTION, SOLUTION INTRAMUSCULAR; INTRAVENOUS at 16:23

## 2023-02-28 RX ADMIN — HYDROMORPHONE HYDROCHLORIDE 0.5 MG: 1 INJECTION, SOLUTION INTRAMUSCULAR; INTRAVENOUS; SUBCUTANEOUS at 15:00

## 2023-02-28 RX ADMIN — FENTANYL CITRATE 50 MCG: 50 INJECTION, SOLUTION INTRAMUSCULAR; INTRAVENOUS at 17:48

## 2023-02-28 RX ADMIN — SODIUM CHLORIDE 1000 ML: 9 INJECTION, SOLUTION INTRAVENOUS at 13:22

## 2023-02-28 RX ADMIN — ROCURONIUM BROMIDE 50 MG: 10 INJECTION, SOLUTION INTRAVENOUS at 16:25

## 2023-02-28 RX ADMIN — LIDOCAINE HYDROCHLORIDE 4 ML: 40 SOLUTION TOPICAL at 16:25

## 2023-02-28 RX ADMIN — FENTANYL CITRATE 50 MCG: 50 INJECTION, SOLUTION INTRAMUSCULAR; INTRAVENOUS at 18:07

## 2023-02-28 RX ADMIN — IOHEXOL 100 ML: 350 INJECTION, SOLUTION INTRAVENOUS at 14:36

## 2023-02-28 RX ADMIN — FENTANYL CITRATE 50 MCG: 50 INJECTION, SOLUTION INTRAMUSCULAR; INTRAVENOUS at 17:24

## 2023-02-28 RX ADMIN — ONDANSETRON 4 MG: 2 INJECTION INTRAMUSCULAR; INTRAVENOUS at 16:50

## 2023-02-28 RX ADMIN — MEPERIDINE HYDROCHLORIDE 6.25 MG: 25 INJECTION INTRAMUSCULAR; INTRAVENOUS; SUBCUTANEOUS at 17:31

## 2023-02-28 RX ADMIN — DEXAMETHASONE SODIUM PHOSPHATE 8 MG: 4 INJECTION, SOLUTION INTRA-ARTICULAR; INTRALESIONAL; INTRAMUSCULAR; INTRAVENOUS; SOFT TISSUE at 16:50

## 2023-02-28 RX ADMIN — HYDROMORPHONE HYDROCHLORIDE 0.5 MG: 1 INJECTION, SOLUTION INTRAMUSCULAR; INTRAVENOUS; SUBCUTANEOUS at 13:21

## 2023-02-28 RX ADMIN — PROPOFOL 200 MG: 10 INJECTION, EMULSION INTRAVENOUS at 16:25

## 2023-02-28 RX ADMIN — OXYCODONE HYDROCHLORIDE 10 MG: 5 SOLUTION ORAL at 17:38

## 2023-02-28 RX ADMIN — ROCURONIUM BROMIDE 20 MG: 10 INJECTION, SOLUTION INTRAVENOUS at 16:53

## 2023-02-28 RX ADMIN — METRONIDAZOLE 500 MG: 5 INJECTION, SOLUTION INTRAVENOUS at 14:49

## 2023-02-28 RX ADMIN — FENTANYL CITRATE 100 MCG: 50 INJECTION, SOLUTION INTRAMUSCULAR; INTRAVENOUS at 17:06

## 2023-02-28 RX ADMIN — FENTANYL CITRATE 50 MCG: 50 INJECTION, SOLUTION INTRAMUSCULAR; INTRAVENOUS at 17:38

## 2023-02-28 RX ADMIN — GLYCOPYRROLATE 0.3 MG: 0.2 INJECTION INTRAMUSCULAR; INTRAVENOUS at 16:53

## 2023-02-28 RX ADMIN — SUGAMMADEX 200 MG: 100 INJECTION, SOLUTION INTRAVENOUS at 17:10

## 2023-02-28 RX ADMIN — SODIUM CHLORIDE, POTASSIUM CHLORIDE, SODIUM LACTATE AND CALCIUM CHLORIDE: 600; 310; 30; 20 INJECTION, SOLUTION INTRAVENOUS at 16:22

## 2023-02-28 ASSESSMENT — FIBROSIS 4 INDEX: FIB4 SCORE: 1.12

## 2023-02-28 ASSESSMENT — PAIN DESCRIPTION - PAIN TYPE: TYPE: SURGICAL PAIN

## 2023-02-28 ASSESSMENT — PAIN SCALES - GENERAL: PAIN_LEVEL: 5

## 2023-02-28 NOTE — ED NOTES
Med Rec complete per Pt at bedside. Pt reports taking a Norco() once yesterday and uses PRN from old RX.   Allergies reviewed.  Home Pharmacy:  [facility]

## 2023-02-28 NOTE — H&P
Surgery General History & Physical Note    Date  2023    Primary Care Physician  Rocio Pérez D.O.    CC  Abdominal pain    HPI  This is a 53 y.o. male who presented with 1 day history of abdominal pain associated with fevers and chills, no N/V or diarrhea. He does endorse recent constipation but had a regular BM this am, no hematochezia or melena    Past Medical History:   Diagnosis Date    Hx of cold sores     oral    Staphylococcus infection in conditions classified elsewhere and of unspecified site     hand, face       Past Surgical History:   Procedure Laterality Date    CERVICAL DISK AND FUSION ANTERIOR N/A 2021    Procedure: CERVICAL 4-5 ANTERIOR CERVICAL DISCECTOMY WITH FUSION, MINIMALLY INVASIVE;  Surgeon: Harvey Renner M.D.;  Location: SURGERY [facility];  Service: Neurosurgery    CERVICAL DISK AND FUSION ANTERIOR  3/13/2014    Performed by Harvey Renner M.D. at SURGERY [facility] ORS       Current Facility-Administered Medications   Medication Dose Route Frequency Provider Last Rate Last Admin    metroNIDAZOLE (Flagyl) IVPB 500 mg  500 mg Intravenous Once Case Singh M.D. 100 mL/hr at 23 1449 500 mg at 23 1449     Current Outpatient Medications   Medication Sig Dispense Refill    ibuprofen (MOTRIN) 200 MG Tab Take 200-800 mg by mouth every 6 hours as needed for Mild Pain, Inflammation or Fever.         Social History     Socioeconomic History    Marital status:      Spouse name: Not on file    Number of children: Not on file    Years of education: Not on file    Highest education level: Not on file   Occupational History    Not on file   Tobacco Use    Smoking status: Former     Packs/day: 1.00     Years: 15.00     Pack years: 15.00     Types: Cigarettes     Start date: 1985     Quit date: 2000     Years since quittin.6    Smokeless tobacco: Former     Types: Snuff, Chew     Quit date: 2000   Vaping Use    Vaping Use: Never used   Substance  and Sexual Activity    Alcohol use: No     Comment: none since 2012    Drug use: Yes     Comment: meth iv used last 2012    Sexual activity: Not on file   Other Topics Concern    Not on file   Social History Narrative    ** Merged History Encounter **          Social Determinants of Health     Financial Resource Strain: Not on file   Food Insecurity: Not on file   Transportation Needs: Not on file   Physical Activity: Not on file   Stress: Not on file   Social Connections: Not on file   Intimate Partner Violence: Not on file   Housing Stability: Not on file       No family history on file.    Allergies  Morphine, Morphine, Pcn [penicillins], and Penicillins    Review of Systems  Negative except for abdominal pain, fevers ad mentioned above in HPI    Physical Exam  Appears well  Neck supple  Regular heart rate  Normal respiratory effort  Abdomen soft, TTP in RLQ, no rebound or guarding  Ext perfused  Skin pink        Vital Signs  Blood Pressure: (!) 152/98   Temperature: 36.2 °C (97.2 °F)   Pulse: 66   Respiration: 18   Pulse Oximetry: 98 %       Labs:  Recent Labs     02/28/23  1153   WBC 8.1   RBC 5.32   HEMOGLOBIN 16.6   HEMATOCRIT 47.3   MCV 88.9   MCH 31.2   MCHC 35.1   RDW 38.4   PLATELETCT 260   MPV 8.9*     Recent Labs     02/28/23  1153   SODIUM 137   POTASSIUM 4.5   CHLORIDE 101   CO2 26   GLUCOSE 104*   BUN 11   CREATININE 0.76   CALCIUM 9.8         Recent Labs     02/28/23  1153   ASTSGOT 29   ALTSGPT 43   TBILIRUBIN 1.0   ALKPHOSPHAT 64   GLOBULIN 3.3       Radiology:  CT-ABDOMEN-PELVIS WITH   Final Result      Findings suggesting acute appendicitis without evidence of perforation or intra-abdominal abscess.            Assessment/Plan:  52 y/o male with acute appendicitis as identified by CT  NPO  IV abx  IVF  To OR for laparoscopic appendectomy  Remainder of care based upon intraoperative findings

## 2023-02-28 NOTE — ED TRIAGE NOTES
"Chief Complaint   Patient presents with    Abdominal Pain     RLQ starting yesterday, has appendix, LBM this AM, denies signs of bleeding, denies N/V, denies GI hx, denies urinary changes. Pt states fever last night of 101F.      Pt ambulatory to triage for above complaints, VSS on RA, GCS 15, NAD.    Pt returned to lobby. Educated on triage process and to inform staff of any changes.     BP (!) 152/98   Pulse 66   Temp 36.2 °C (97.2 °F) (Temporal)   Resp 18   Ht 1.93 m (6' 4\")   Wt 98.1 kg (216 lb 4.3 oz)   SpO2 98%   BMI 26.33 kg/m²     "

## 2023-02-28 NOTE — ANESTHESIA PREPROCEDURE EVALUATION
" Case: [id number] Date/Time: 02/28/23 1515    Procedure: APPENDECTOMY, LAPAROSCOPIC    Location: [facility] OR 10 / SURGERY [facility]    Surgeons: Kyler Guy M.D.          Relevant Problems   No relevant active problems     Acute appendicitis.     /BP (!) 152/98   Pulse 66   Temp 36.2 °C (97.2 °F) (Temporal)   Resp 18   Ht 1.93 m (6' 4\")   Wt 98.1 kg (216 lb 4.3 oz)   SpO2 98%   BMI 26.33 kg/m²     Physical Exam    Airway   Mallampati: II  TM distance: >3 FB  Neck ROM: full       Cardiovascular - normal exam  Rhythm: regular  Rate: normal  (-) murmur     Dental - normal exam           Pulmonary - normal exam  Breath sounds clear to auscultation     Abdominal    Neurological - normal exam                 Anesthesia Plan    ASA 2- EMERGENT   ASA physical status emergent criteria: acute peritonitis    Plan - general       Airway plan will be ETT          Induction: intravenous    Postoperative Plan: Postoperative administration of opioids is intended.    Pertinent diagnostic labs and testing reviewed    Informed Consent:    Anesthetic plan and risks discussed with patient.    Use of blood products discussed with: patient whom consented to blood products.         "

## 2023-02-28 NOTE — ED PROVIDER NOTES
ED Provider Note    CHIEF COMPLAINT  Chief Complaint   Patient presents with    Abdominal Pain     RLQ starting yesterday, has appendix, LBM this AM, denies signs of bleeding, denies N/V, denies GI hx, denies urinary changes. Pt states fever last night of 101F.        EXTERNAL RECORDS REVIEWED  Other none    HPI/ROS  LIMITATION TO HISTORY   Select: : None  OUTSIDE HISTORIAN(S):  None    Osmani Fitzpatrick is a 53 y.o. male who presents with abdominal pain and fever that started yesterday.  Patient reports he initially developed cramping abdominal pain that was diffuse and nonradiating.  He had a fever of 101 °F last night for which she took ibuprofen.  This morning when he awoke the pain was located focally in his right lower quadrant.  He denies any flank pain.  The pain does not radiate to his testicles.  He drank 2 cups of coffee at 8 AM thinking that he needed to stool and had a normal bowel movement that did not improve his symptoms.  He still does have his appendix.  He denies any nausea or vomiting, diarrhea, dysuria, hematuria.    PAST MEDICAL HISTORY   has a past medical history of cold sores and Staphylococcus infection in conditions classified elsewhere and of unspecified site ().    SURGICAL HISTORY   has a past surgical history that includes cervical disk and fusion anterior (3/13/2014) and cervical disk and fusion anterior (N/A, 2021).    FAMILY HISTORY  No family history on file.    SOCIAL HISTORY  Social History     Tobacco Use    Smoking status: Former     Packs/day: 1.00     Years: 15.00     Pack years: 15.00     Types: Cigarettes     Start date: 1985     Quit date: 2000     Years since quittin.6    Smokeless tobacco: Former     Types: Snuff, Chew     Quit date: 2000   Vaping Use    Vaping Use: Never used   Substance and Sexual Activity    Alcohol use: No     Comment: none since     Drug use: Yes     Comment: meth iv used last     Sexual activity: Not on file  "      CURRENT MEDICATIONS  Home Medications       Reviewed by Wilfred Thrasher R.N. (Registered Nurse) on 02/28/23 at 1143  Med List Status: Not Addressed     Medication Last Dose Status   cyclobenzaprine (FLEXERIL) 10 MG TABS  Active                    ALLERGIES  Allergies   Allergen Reactions    Morphine Palpitations     palpitations    Morphine Unspecified     Chest pain    Pcn [Penicillins]      Childhood allergy    Penicillins      As a child (mom told me)       PHYSICAL EXAM  VITAL SIGNS: BP (!) 152/98   Pulse 66   Temp 36.2 °C (97.2 °F) (Temporal)   Resp 18   Ht 1.93 m (6' 4\")   Wt 98.1 kg (216 lb 4.3 oz)   SpO2 98%   BMI 26.33 kg/m²    Physical Exam  Vitals and nursing note reviewed.   Constitutional:       Appearance: He is well-developed.   HENT:      Head: Normocephalic and atraumatic.      Mouth/Throat:      Comments: Dry mucous membranes  Eyes:      Extraocular Movements: Extraocular movements intact.      Pupils: Pupils are equal, round, and reactive to light.   Cardiovascular:      Rate and Rhythm: Normal rate and regular rhythm.      Heart sounds: Normal heart sounds.   Pulmonary:      Effort: Pulmonary effort is normal.      Breath sounds: Normal breath sounds.   Abdominal:      General: Abdomen is flat. Bowel sounds are normal.      Palpations: Abdomen is soft.      Comments: Focal right lower quadrant tenderness without peritoneal signs.  Negative Rovsing sign.  No rebound.  There is guarding over the right lower quadrant.   Skin:     General: Skin is warm and dry.   Neurological:      General: No focal deficit present.      Mental Status: He is alert and oriented to person, place, and time.   Psychiatric:         Mood and Affect: Mood normal.         Behavior: Behavior normal.     DIAGNOSTIC STUDIES / PROCEDURES  LABS  Results for orders placed or performed during the hospital encounter of 02/28/23   CBC WITH DIFFERENTIAL   Result Value Ref Range    WBC 8.1 4.8 - 10.8 K/uL    RBC 5.32 4.70 " - 6.10 M/uL    Hemoglobin 16.6 14.0 - 18.0 g/dL    Hematocrit 47.3 42.0 - 52.0 %    MCV 88.9 81.4 - 97.8 fL    MCH 31.2 27.0 - 33.0 pg    MCHC 35.1 33.7 - 35.3 g/dL    RDW 38.4 35.9 - 50.0 fL    Platelet Count 260 164 - 446 K/uL    MPV 8.9 (L) 9.0 - 12.9 fL    Neutrophils-Polys 68.20 44.00 - 72.00 %    Lymphocytes 20.90 (L) 22.00 - 41.00 %    Monocytes 8.10 0.00 - 13.40 %    Eosinophils 2.10 0.00 - 6.90 %    Basophils 0.50 0.00 - 1.80 %    Immature Granulocytes 0.20 0.00 - 0.90 %    Nucleated RBC 0.00 /100 WBC    Neutrophils (Absolute) 5.50 1.82 - 7.42 K/uL    Lymphs (Absolute) 1.69 1.00 - 4.80 K/uL    Monos (Absolute) 0.65 0.00 - 0.85 K/uL    Eos (Absolute) 0.17 0.00 - 0.51 K/uL    Baso (Absolute) 0.04 0.00 - 0.12 K/uL    Immature Granulocytes (abs) 0.02 0.00 - 0.11 K/uL    NRBC (Absolute) 0.00 K/uL   COMP METABOLIC PANEL   Result Value Ref Range    Sodium 137 135 - 145 mmol/L    Potassium 4.5 3.6 - 5.5 mmol/L    Chloride 101 96 - 112 mmol/L    Co2 26 20 - 33 mmol/L    Anion Gap 10.0 7.0 - 16.0    Glucose 104 (H) 65 - 99 mg/dL    Bun 11 8 - 22 mg/dL    Creatinine 0.76 0.50 - 1.40 mg/dL    Calcium 9.8 8.5 - 10.5 mg/dL    AST(SGOT) 29 12 - 45 U/L    ALT(SGPT) 43 2 - 50 U/L    Alkaline Phosphatase 64 30 - 99 U/L    Total Bilirubin 1.0 0.1 - 1.5 mg/dL    Albumin 4.4 3.2 - 4.9 g/dL    Total Protein 7.7 6.0 - 8.2 g/dL    Globulin 3.3 1.9 - 3.5 g/dL    A-G Ratio 1.3 g/dL   LIPASE   Result Value Ref Range    Lipase 18 11 - 82 U/L   CORRECTED CALCIUM   Result Value Ref Range    Correct Calcium 9.5 8.5 - 10.5 mg/dL   ESTIMATED GFR   Result Value Ref Range    GFR (CKD-EPI) 109 >60 mL/min/1.73 m 2     RADIOLOGY  CT-ABDOMEN-PELVIS WITH    (Results Pending)     COURSE & MEDICAL DECISION MAKING    ED Observation Status? No; Patient does not meet criteria for ED Observation.     INITIAL ASSESSMENT, COURSE AND PLAN  Care Narrative: This is a 53-year-old male who still has his appendix here with focal right lower quadrant pain that  started yesterday at which time it was diffuse.  There has been a fever of 101 °F last night but here he is afebrile with normal heart rate and O2 sats.  He is slightly hypertensive with systolic blood pressure of 152.  Last p.o. intake was 2 cups of coffee at 8 AM this morning.    Differential diagnosis includes, but is not limited to, appendicitis, ureterolithiasis, testicular torsion, UTI, colitis, mesenteric adenitis.    Started on IV fluids.  N.p.o. in preparation for potential surgical intervention.  Has a penicillin allergy so was given a dose of ceftriaxone and Flagyl.  Given a dose of pain medication.  Metabolic panel reveals normal electrolytes, renal, liver function, and lipase.  Glucose is very slightly elevated to 104.    Labs are reassuring without leukocytosis.  Metabolic panel is grossly normal with the exception of glucose to 104.  Lipase is normal.  UA does not suggest infection.    CT abdomen/pelvis obtained to evaluate for appendicitis and unfortunately this does reveal acute appendicitis without perforation or abscess.  Patient was discussed with on-call general surgeon, Dr. Guy, who will take the patient to the operating room.  Discussed findings with the patient who understands the plan.  He was given a second dose of Dilaudid.  Admitted in guarded condition.    HYDRATION: Based on the patient's presentation of Inability to take oral fluids the patient was given IV fluids. IV Hydration was used because oral hydration was not adequate alone. Upon recheck following hydration, the patient was unchanged.    ADDITIONAL PROBLEM LIST  Acute appendicitis    DISPOSITION AND DISCUSSIONS  I have discussed management of the patient with the following physicians and KURTIS's: Dr. Guy, general surgery    Discussion of management with other [facility] or appropriate source(s): None     Escalation of care considered, and ultimately not performed: N/A.    Barriers to care at this time, including but not limited  to:  None .     Decision tools and prescription drugs considered including, but not limited to: Antibiotics -ceftriaxone and Flagyl given due to concern for appendicitis .    FINAL DIAGNOSIS  1.  Acute appendicitis     Electronically signed by: Case Singh M.D., 2/28/2023 12:53 PM

## 2023-03-01 LAB — PATHOLOGY CONSULT NOTE: NORMAL

## 2023-03-01 NOTE — DISCHARGE INSTRUCTIONS
HOME CARE INSTRUCTIONS    ACTIVITY: Rest and take it easy for the first 24 hours.  A responsible adult is recommended to remain with you during that time.  It is normal to feel sleepy.  We encourage you to not do anything that requires balance, judgment or coordination.    FOR 24 HOURS DO NOT:  Drive, operate machinery or run household appliances.  Drink beer or alcoholic beverages.  Make important decisions or sign legal documents.    SPECIAL INSTRUCTIONS:     1.  No heavy lifting (less than 15 pounds) for 6 weeks  2.  Diet as tolerated    DIET: To avoid nausea, slowly advance diet as tolerated, avoiding spicy or greasy foods for the first day.  Add more substantial food to your diet according to your physician's instructions.  Babies can be fed formula or breast milk as soon as they are hungry.  INCREASE FLUIDS AND FIBER TO AVOID CONSTIPATION.    SURGICAL DRESSING/BATHING: dermabond (skin glue) on incisions, you may shower tomorrow. No scrubbing sites.     MEDICATIONS: Resume taking daily medication.  Take prescribed pain medication with food.  If no medication is prescribed, you may take non-aspirin pain medication if needed.  PAIN MEDICATION CAN BE VERY CONSTIPATING.  Take a stool softener or laxative such as senokot, pericolace, or milk of magnesia if needed.    Prescription given for oxycodone.  Last pain medication given at 5:30 pm.    A follow-up appointment should be arranged with your doctor in 1-2 weeks; call to schedule.    You should CALL YOUR PHYSICIAN if you develop:  Fever greater than 101 degrees F.  Pain not relieved by medication, or persistent nausea or vomiting.  Excessive bleeding (blood soaking through dressing) or unexpected drainage from the wound.  Extreme redness or swelling around the incision site, drainage of pus or foul smelling drainage.  Inability to urinate or empty your bladder within 8 hours.  Problems with breathing or chest pain.    You should call 911 if you develop problems  with breathing or chest pain.  If you are unable to contact your doctor or surgical center, you should go to the nearest emergency room or urgent care center.  Physician's telephone #: [phone]    MILD FLU-LIKE SYMPTOMS ARE NORMAL.  YOU MAY EXPERIENCE GENERALIZED MUSCLE ACHES, THROAT IRRITATION, HEADACHE AND/OR SOME NAUSEA.    If any questions arise, call your doctor.  If your doctor is not available, please feel free to call the Surgical Center at [phone].  The Center is open Monday through Friday from 7AM to 7PM.      A registered nurse may call you a few days after your surgery to see how you are doing after your procedure.    You may also receive a survey in the mail within the next two weeks and we ask that you take a few moments to complete the survey and return it to us.  Our goal is to provide you with very good care and we value your comments.     Depression / Suicide Risk    As you are discharged from this [facility] facility, it is important to learn how to keep safe from harming yourself.    Recognize the warning signs:  Abrupt changes in personality, positive or negative- including increase in energy   Giving away possessions  Change in eating patterns- significant weight changes-  positive or negative  Change in sleeping patterns- unable to sleep or sleeping all the time   Unwillingness or inability to communicate  Depression  Unusual sadness, discouragement and loneliness  Talk of wanting to die  Neglect of personal appearance   Rebelliousness- reckless behavior  Withdrawal from people/activities they love  Confusion- inability to concentrate     If you or a loved one observes any of these behaviors or has concerns about self-harm, here's what you can do:  Talk about it- your feelings and reasons for harming yourself  Remove any means that you might use to hurt yourself (examples: pills, rope, extension cords, firearm)  Get professional help from the community (Mental Health, Substance  Abuse, psychological counseling)  Do not be alone:Call your Safe Contact- someone whom you trust who will be there for you.  Call your local CRISIS HOTLINE 636-8793 or 272-069-7999  Call your local Children's Mobile Crisis Response Team [place] (611) 897-5316 or www.[employer]  Call the toll free National Suicide Prevention Hotlines   National Suicide Prevention Lifeline 186-522-CVMN (9266)  [place] [employer] Network 800-SUICIDE (554-6093)    I acknowledge receipt and understanding of these Home Care instructions.

## 2023-03-01 NOTE — OR NURSING
Patient recovered well post op. A&Ox4. VSS, room air. Surgical sites CDI. Surgical pain managed. Patient able to drink fluids without Nausea and vomiting. PT belongings in phase two. Spouse updated and discussed plan of care. Report called to phase two RN.

## 2023-03-01 NOTE — ANESTHESIA PROCEDURE NOTES
Airway    Date/Time: 2/28/2023 4:27 PM  Performed by: Sebastian Márquez M.D.  Authorized by: Sebastian Márquez M.D.     Location:  OR  Urgency:  Elective  Difficult Airway: No    Indications for Airway Management:  Anesthesia      Spontaneous Ventilation: absent    Sedation Level:  Deep  Preoxygenated: Yes    Patient Position:  Sniffing  Mask Difficulty Assessment:  0 - not attempted  Final Airway Type:  Endotracheal airway  Final Endotracheal Airway:  ETT  Cuffed: Yes    Technique Used for Successful ETT Placement:  Direct laryngoscopy    Insertion Site:  Oral  Blade Type:  Alexia  Laryngoscope Blade/Videolaryngoscope Blade Size:  3  ETT Size (mm):  7.5  Measured from:  Teeth  ETT to Teeth (cm):  24  Placement Verified by: auscultation and capnometry    Cormack-Lehane Classification:  Grade I - full view of glottis  Number of Attempts at Approach:  1

## 2023-03-01 NOTE — ANESTHESIA TIME REPORT
Anesthesia Start and Stop Event Times     Date Time Event    2/28/2023 1600 Ready for Procedure     1622 Anesthesia Start     1721 Anesthesia Stop        Responsible Staff  02/28/23    Name Role Begin End    Sebastian Márquez M.D. Anesth 1622 1721        Overtime Reason:  no overtime (within assigned shift)    Comments:

## 2023-03-01 NOTE — OR SURGEON
OP Note    PreOp Diagnosis:  1.  Acute appendicitis    PostOp Diagnosis:  1.  Acute nonperforated appendicitis     Procedure(s):  Laparoscopic appendectomy    Wound Class: Clean contaminated    Surgeon(s):  Kyler Guy M.D.    Anesthesiologist/Type of Anesthesia:  Anesthesiologist: Sebastian Márquez M.D./General    Surgical Staff:  Circulator: Bonita Tabares R.N.  Scrub Person: Gatito Yanez  Count [place]: Adarsh Bonds R.N.    Specimens removed if any:  ID Type Source Tests Collected by Time Destination   A : APPENDIX Tissue Appendix PATHOLOGY SPECIMEN Kyler Guy M.D. 2/28/2023  5:04 PM        Estimated Blood Loss: 10 cc    Findings: No evidence of perforation    Complications: None observed    The patient was met in the preoperative holding area where all of the potential risks and benefits of the procedure were discussed in detail with the patient. All of his questions were answered to his satisfaction and informed consent was signed. The patient was taken back to the operating room and placed supine on the operating room table. General endotracheal anesthesia was induced and all of the patients pressure points were padded appropriately. The patients abdomen was prepped and draped in the usual sterile fashion.  A timeout was performed which correctly identified the patient and the procedure being performed and preoperative antibiotics were given according to SCIP guidelines.     The procedure was started with infiltration of 0.5% marcaine into the supra-umbilical skin. A 12mm incision was made and carried down to fascia. The verress needle was inserted into the abdominal cavity and insufflation carried out to 15mmHg.  A 5mm visiport was inserted into the abdominal cavity under direct visualization and then this was exchanged for a 12mm trochar. The camera was inserted into the abdominal cavity and a brief survey of the abdominal cavity demonstrated no obvious injuries or pathologies  other than dilated and inflamed appendix.  5mm ports were then placed in the suprapubic and left lower quadrants after the skin was infiltrated with 0.5% marcaine.     The appendix was located in its right lower quadrant position.  It was dilated and inflamed, and after the mesentery was peeled away from it, no obvious perforation was observed.  A 5mm laparoscopic ligasure was used to carefully take the mesoappendix down to the base of the appendix.  A 45 mm vascular load stapler was used to transect the appendix at its base.  An Endo Catch was then inserted into the abdominal cavity to retrieve the appendix via the supra-umbilical incision.  The staple line was intact and not bleeding at end of the case.  Hemostasis was meticulously achieved.  An Endo Close device was used to close the fascia of the supra-umbilical trocar with a 0 vicryl suture.  4/0 Monocryl was used to close skin edges.  Dermabond was applied as a dressing.      The patient tolerated the procedure well and was extubated at the conclusion of the case without incident. All of the sponge, needle and instrument counts were correct at the conclusion of the case. After he was awoken from anesthesia he was taken to the recovery room in stable condition.     2/28/2023 5:13 PM Kyler Guy M.D.

## 2023-03-01 NOTE — ANESTHESIA POSTPROCEDURE EVALUATION
Patient: Osmani Fitzpatrick    Procedure Summary     Date: 02/28/23 Room / Location: [place] [address] / SURGERY [facility]    Anesthesia Start: 1622 Anesthesia Stop: 1721    Procedure: LAPAROSCOPIC APPENDECTOMY (Abdomen) Diagnosis: ( acute appendicitis )    Surgeons: Kyler Guy M.D. Responsible Provider: Sebastian Márquez M.D.    Anesthesia Type: general ASA Status: 2 - Emergent          Final Anesthesia Type: general  Last vitals  BP   Blood Pressure: 119/82    Temp   36.6 °C (97.9 °F)    Pulse   68   Resp   18    SpO2   94 %      Anesthesia Post Evaluation    Patient location during evaluation: PACU  Patient participation: complete - patient participated  Level of consciousness: awake and alert  Pain score: 5    Airway patency: patent  Anesthetic complications: no  Cardiovascular status: hemodynamically stable  Respiratory status: acceptable  Hydration status: euvolemic    PONV: none          No notable events documented.     Nurse Pain Score: 7 (NPRS)

## 2023-03-02 NOTE — OP REPORT
OP Note     PreOp Diagnosis:  1.  Acute appendicitis     PostOp Diagnosis:  1.  Acute nonperforated appendicitis      Procedure(s):  Laparoscopic appendectomy     Wound Class: Clean contaminated     Surgeon(s):  Kyler Guy M.D.     Anesthesiologist/Type of Anesthesia:  Anesthesiologist: Sebastian Márquez M.D./General     Surgical Staff:  Circulator: Bonita Tabares R.N.  Scrub Person: Gatito Yanez  Count [place]: Adarsh Bonds R.N.     Specimens removed if any:  ID Type Source Tests Collected by Time Destination   A : APPENDIX Tissue Appendix PATHOLOGY SPECIMEN Kyler Guy M.D. 2/28/2023  5:04 PM           Estimated Blood Loss: 10 cc     Findings: No evidence of perforation     Complications: None observed     The patient was met in the preoperative holding area where all of the potential risks and benefits of the procedure were discussed in detail with the patient. All of his questions were answered to his satisfaction and informed consent was signed. The patient was taken back to the operating room and placed supine on the operating room table. General endotracheal anesthesia was induced and all of the patients pressure points were padded appropriately. The patients abdomen was prepped and draped in the usual sterile fashion.  A timeout was performed which correctly identified the patient and the procedure being performed and preoperative antibiotics were given according to SCIP guidelines.      The procedure was started with infiltration of 0.5% marcaine into the supra-umbilical skin. A 12mm incision was made and carried down to fascia. The verress needle was inserted into the abdominal cavity and insufflation carried out to 15mmHg.  A 5mm visiport was inserted into the abdominal cavity under direct visualization and then this was exchanged for a 12mm trochar. The camera was inserted into the abdominal cavity and a brief survey of the abdominal cavity demonstrated no obvious injuries  or pathologies other than dilated and inflamed appendix.  5mm ports were then placed in the suprapubic and left lower quadrants after the skin was infiltrated with 0.5% marcaine.      The appendix was located in its right lower quadrant position.  It was dilated and inflamed, and after the mesentery was peeled away from it, no obvious perforation was observed.  A 5mm laparoscopic ligasure was used to carefully take the mesoappendix down to the base of the appendix.  A 45 mm vascular load stapler was used to transect the appendix at its base.  An Endo Catch was then inserted into the abdominal cavity to retrieve the appendix via the supra-umbilical incision.  The staple line was intact and not bleeding at end of the case.  Hemostasis was meticulously achieved.  An Endo Close device was used to close the fascia of the supra-umbilical trocar with a 0 vicryl suture.  4/0 Monocryl was used to close skin edges.  Dermabond was applied as a dressing.       The patient tolerated the procedure well and was extubated at the conclusion of the case without incident. All of the sponge, needle and instrument counts were correct at the conclusion of the case. After he was awoken from anesthesia he was taken to the recovery room in stable condition.      2/28/2023 5:13 PM Kyler Guy M.D.

## 2024-01-24 ENCOUNTER — HOSPITAL ENCOUNTER (EMERGENCY)
Facility: MEDICAL CENTER | Age: 54
End: 2024-01-24
Attending: EMERGENCY MEDICINE
Payer: COMMERCIAL

## 2024-01-24 ENCOUNTER — APPOINTMENT (OUTPATIENT)
Dept: RADIOLOGY | Facility: MEDICAL CENTER | Age: 54
End: 2024-01-24
Attending: EMERGENCY MEDICINE
Payer: COMMERCIAL

## 2024-01-24 VITALS
TEMPERATURE: 98.2 F | HEART RATE: 63 BPM | DIASTOLIC BLOOD PRESSURE: 74 MMHG | SYSTOLIC BLOOD PRESSURE: 145 MMHG | HEIGHT: 76 IN | OXYGEN SATURATION: 94 % | BODY MASS INDEX: 25.57 KG/M2 | WEIGHT: 210 LBS | RESPIRATION RATE: 16 BRPM

## 2024-01-24 DIAGNOSIS — S83.411A SPRAIN OF MEDIAL COLLATERAL LIGAMENT OF RIGHT KNEE, INITIAL ENCOUNTER: ICD-10-CM

## 2024-01-24 PROCEDURE — A9270 NON-COVERED ITEM OR SERVICE: HCPCS | Performed by: EMERGENCY MEDICINE

## 2024-01-24 PROCEDURE — 99283 EMERGENCY DEPT VISIT LOW MDM: CPT

## 2024-01-24 PROCEDURE — 73564 X-RAY EXAM KNEE 4 OR MORE: CPT | Mod: RT

## 2024-01-24 PROCEDURE — 700102 HCHG RX REV CODE 250 W/ 637 OVERRIDE(OP): Performed by: EMERGENCY MEDICINE

## 2024-01-24 RX ORDER — HYDROCODONE BITARTRATE AND ACETAMINOPHEN 5; 325 MG/1; MG/1
2 TABLET ORAL ONCE
Status: COMPLETED | OUTPATIENT
Start: 2024-01-24 | End: 2024-01-24

## 2024-01-24 RX ORDER — HYDROCODONE BITARTRATE AND ACETAMINOPHEN 5; 325 MG/1; MG/1
1-2 TABLET ORAL EVERY 6 HOURS PRN
Qty: 15 TABLET | Refills: 0 | Status: SHIPPED | OUTPATIENT
Start: 2024-01-24 | End: 2024-01-29

## 2024-01-24 RX ADMIN — HYDROCODONE BITARTRATE AND ACETAMINOPHEN 2 TABLET: 5; 325 TABLET ORAL at 20:31

## 2024-01-24 ASSESSMENT — FIBROSIS 4 INDEX: FIB4 SCORE: 0.87

## 2024-01-24 ASSESSMENT — PAIN DESCRIPTION - PAIN TYPE
TYPE: ACUTE PAIN
TYPE: ACUTE PAIN

## 2024-01-25 NOTE — ED PROVIDER NOTES
ED Provider Note    CHIEF COMPLAINT  Chief Complaint   Patient presents with    Knee Pain     PT reports his dog hit his R knee last night from the lateral side, and is now experiencing severe pain to that area.  Some swelling noted to medial side of knee           HPI/ROS  LIMITATION TO HISTORY   Select: : None  OUTSIDE HISTORIAN(S):  anu    Osmani Fitzpatrick is a 53 y.o. male who presents to the emerged part chief complaint of right knee pain.  He states that his dog ran into him really hard on the lateral aspect of his knee and pushed it inward.  He states ever since any send progressively worsening pain to the medial aspect of the knee.  He and flex and extend it with only minimal pain but any type of twisting motion or when he goes to step it becomes unstable.  No weakness numbness or tingling.  He is tried NSAIDs and Tylenol without relief of pain.    PAST MEDICAL HISTORY   has a past medical history of cold sores and Staphylococcus infection in conditions classified elsewhere and of unspecified site ().    SURGICAL HISTORY   has a past surgical history that includes cervical disk and fusion anterior (3/13/2014); cervical disk and fusion anterior (N/A, 2021); and lap,appendectomy (N/A, 2023).    FAMILY HISTORY  History reviewed. No pertinent family history.    SOCIAL HISTORY  Social History     Tobacco Use    Smoking status: Former     Current packs/day: 0.00     Average packs/day: 1 pack/day for 15.0 years (15.0 ttl pk-yrs)     Types: Cigarettes     Start date: 1985     Quit date: 2000     Years since quittin.5    Smokeless tobacco: Former     Types: Snuff, Chew     Quit date: 2000   Vaping Use    Vaping Use: Never used   Substance and Sexual Activity    Alcohol use: No     Comment: none since     Drug use: Yes     Comment: meth iv used last     Sexual activity: Not on file       CURRENT MEDICATIONS  Home Medications       Reviewed by Concha Macias R.N. (Registered  "Nurse) on 01/24/24 at 1831  Med List Status: Partial     Medication Last Dose Status   ibuprofen (MOTRIN) 200 MG Tab  Active                    ALLERGIES  Allergies   Allergen Reactions    Morphine Palpitations     palpitations    Morphine Unspecified     Chest pain    Pcn [Penicillins]      Childhood allergy    Penicillins      As a child (mom told me)       PHYSICAL EXAM  VITAL SIGNS: BP (!) 157/85   Pulse 60   Temp 36.8 °C (98.2 °F) (Temporal)   Resp 18   Ht 1.93 m (6' 4\")   Wt 95.3 kg (210 lb)   SpO2 92%   BMI 25.56 kg/m²    Pulse OX: Pulse Oxygen level is within normal limits  Constitutional: Alert in no apparent distress.  HENT: Normocephalic, Atraumatic, MMM  EXT/Back right lower extremity there is no joint effusion no tenderness along the aspect there is tenderness mild swelling along the medial aspect of the knee.  DP pulse 2+ full range of motion of the ankle he is got good flexion and extension but tenderness with any type of valgus or varus movement.  Skin: Warm, Dry, No erythema, No rash.   Neurologic: Alert and oriented, Grossly non-focal.       DIAGNOSTIC STUDIES / PROCEDURES    RADIOLOGY  I have independently interpreted the diagnostic imaging associated with this visit and am waiting the final reading from the radiologist.   My preliminary interpretation is as follows:     Right knee without joint effusion or avulsion    Radiologist interpretation:     DX-KNEE COMPLETE 4+ RIGHT   Final Result         1.  No acute traumatic bony injury.            COURSE & MEDICAL DECISION MAKING    ED Observation Status? No; Patient does not meet criteria for ED Observation.     INITIAL ASSESSMENT, COURSE AND PLAN/ DISPOSITION AND DISCUSSIONS  Care Narrative:     Patient is a 53-year-old male with possible right MCL injury and strain.  We discussed Ace wrap crutches for the next 2 days he is having difficulty weightbearing as well as pain management.  He was given some pain management here in the ED.  We " discussed follow-up with orthopedics and RICE treatment.  He understands feels comfortable at home and comfortable to plan.    In prescribing controlled substances to this patient, I certify that I have obtained and reviewed the medical history of Osmani Fitzpatrick. I have also made a good mat effort to obtain applicable records from other providers who have treated the patient and records did not demonstrate any increased risk of substance abuse that would prevent me from prescribing controlled substances.     I have conducted a physical exam and documented it. I have reviewed Mr. Fitzpatrick’s prescription history as maintained by the [place] Prescription Monitoring Program.     I have assessed the patient’s risk for abuse, dependency, and addiction using the validated Opioid Risk Tool available at https://www.mdcalc.com/nccmqj-isqf-uznq-ort-narcotic-abuse. 0    Given the above, I believe the benefits of controlled substance therapy outweigh the risks. The reasons for prescribing controlled substances include non-narcotic, oral analgesic alternatives have been inadequate for pain control. Accordingly, I have discussed the risk and benefits, treatment plan, and alternative therapies with the patient.       I have discussed management of the patient with the following physicians and KURTIS's:  none    Discussion of management with other [facility] or appropriate source(s): None     Escalation of care considered, and ultimately not performed:blood analysis    Barriers to care at this time, including but not limited to:  na .     Decision tools and prescription drugs considered including, but not limited to: Pain Medications were prescribed  .    The patient will return for new or worsening symptoms and is stable at the time of discharge.    The patient is referred to a primary physician for blood pressure management, diabetic screening, and for all other preventative health concerns.    DISPOSITION:  Patient will be discharged  home in stable condition.    FOLLOW UP:  Jorge Kaufman M.D.  [address]  [facility] [address] [phone]    Call on 1/25/2024  or you can walk into the [facility] urgent care      OUTPATIENT MEDICATIONS:  Discharge Medication List as of 1/24/2024  8:36 PM        START taking these medications    Details   HYDROcodone-acetaminophen (NORCO) 5-325 MG Tab per tablet Take 1-2 Tablets by mouth every 6 hours as needed (severe pain) for up to 5 days., Disp-15 Tablet, R-0, Normal               FINAL DIAGNOSIS  1. Sprain of medial collateral ligament of right knee, initial encounter           Electronically signed by: Yarelis Vogel M.D., 1/24/2024 7:36 PM

## 2024-01-25 NOTE — ED TRIAGE NOTES
Chief Complaint   Patient presents with    Knee Pain     PT reports his dog hit his R knee last night from the lateral side, and is now experiencing severe pain to that area.  Some swelling noted to medial side of knee     Pt reports last dose of ibuprofen 800 mg at 1300 did not help with pain.     Xray of R knee ordered per protocol.

## 2025-03-18 ENCOUNTER — APPOINTMENT (OUTPATIENT)
Dept: MEDICAL GROUP | Facility: CLINIC | Age: 55
End: 2025-03-18
Payer: COMMERCIAL

## 2025-03-19 ENCOUNTER — APPOINTMENT (OUTPATIENT)
Dept: MEDICAL GROUP | Facility: CLINIC | Age: 55
End: 2025-03-19
Payer: COMMERCIAL

## 2025-04-10 ENCOUNTER — OFFICE VISIT (OUTPATIENT)
Dept: MEDICAL GROUP | Facility: CLINIC | Age: 55
End: 2025-04-10
Payer: COMMERCIAL

## 2025-04-10 VITALS
WEIGHT: 221.1 LBS | DIASTOLIC BLOOD PRESSURE: 91 MMHG | TEMPERATURE: 97.8 F | HEIGHT: 76 IN | BODY MASS INDEX: 26.92 KG/M2 | OXYGEN SATURATION: 92 % | SYSTOLIC BLOOD PRESSURE: 136 MMHG | HEART RATE: 75 BPM

## 2025-04-10 DIAGNOSIS — R19.09 GROIN LUMP: ICD-10-CM

## 2025-04-10 DIAGNOSIS — B37.2 CUTANEOUS CANDIDIASIS: ICD-10-CM

## 2025-04-10 PROCEDURE — 3075F SYST BP GE 130 - 139MM HG: CPT | Mod: GC

## 2025-04-10 PROCEDURE — 99214 OFFICE O/P EST MOD 30 MIN: CPT | Mod: GC

## 2025-04-10 PROCEDURE — 3080F DIAST BP >= 90 MM HG: CPT | Mod: GC

## 2025-04-10 RX ORDER — NYSTATIN 100000 U/G
1 CREAM TOPICAL 2 TIMES DAILY
Qty: 30 G | Refills: 0 | Status: SHIPPED | OUTPATIENT
Start: 2025-04-10 | End: 2025-04-24

## 2025-04-10 ASSESSMENT — PATIENT HEALTH QUESTIONNAIRE - PHQ9: CLINICAL INTERPRETATION OF PHQ2 SCORE: 0

## 2025-04-10 NOTE — PROGRESS NOTES
"    [facility]       Subjective:     CC:   Chief Complaint   Patient presents with    Other     Lump on groin area x3 weeks ago         HPI:   Osmani is a 55 y.o. male who presents today for a lump and rash in his groin.     Patient works in logging/moving heavy trees and manual work. Over the past few months noticed a lump near his groin that can be reduced easily. He states that this is present whenever he standing and is made worse when bearing down or lifting heavy. He has not had pain or difficulties with reducing it, redness, or warmth. His wife is a nurse and told him that its likely a hernia. He has also recently noticed a red rash develop in the crease of his right groin/thigh, applied triamcinolone to it this morning.     Patient Active Problem List   Diagnosis    Degeneration of cervical intervertebral disc    Paresthesia    Trauma    Central cord syndrome (HCC)    Actinic keratoses    Cutaneous candidiasis    Groin lump       Current Outpatient Medications Ordered in Epic   Medication Sig Dispense Refill    nystatin (MYCOSTATIN) 289739 UNIT/GM Cream topical cream Apply 1 g topically 2 times a day for 14 days. 30 g 0    ibuprofen (MOTRIN) 200 MG Tab Take 200-800 mg by mouth every 6 hours as needed for Mild Pain, Inflammation or Fever.       No current Epic-ordered facility-administered medications on file.       ROS: ROS negative unless otherwise stated in the HPI.     Objective:     Exam:  BP (!) 136/91 (BP Location: Left arm, Patient Position: Sitting, BP Cuff Size: Adult)   Pulse 75   Temp 36.6 °C (97.8 °F) (Temporal)   Ht 1.93 m (6' 4\")   Wt 100 kg (221 lb 1.6 oz)   SpO2 92%   BMI 26.91 kg/m²  Body mass index is 26.91 kg/m².    General: Pt resting in NAD, cooperative   Skin:  Pink, warm and dry.  HEENT: NC/AT. EOMI.  Lungs:  Symmetrical.  CTAB, good air movement   Cardiovascular:  S1/S2 RRR   : Normal genitalia. In standing position, right inguinal region with palpable lump a few " centimeters in diameter that feels easily reproducible. Increases in size while bearing down. Not felt in the inguinal canal. Opposing side normal. Normal testicles bilaterally. Area of redness in the right groin crease with satellite lesions present, no drainage.   Extremities:  Full range of motion.  CNS:  Muscle tone is normal. No gross focal neurologic deficits     Assessment & Plan:     55 y.o. male with the followin. Groin lump  Subjective complaints of groin lump most consistent with inguinal hernia on examination.  Easily reducible. Will send hernia belt to pharmacy and obtain formal ultrasound to confirm hernia. Will also place general surgery referral to discuss options for surgical repair following ultrasound.  ER precautions were discussed including increased redness, warmth, pain or if the hernia is not able to be reproduced which could indicate incarceration or strangulation, in which case he agrees to present to the ER.  - US-INGUINAL HERNIA; Future  - Referral to General Surgery    2. Cutaneous candidiasis  Right inguinal rash consistent with cutaneous candidiasis.  Will send nystatin cream.  Discontinue topical steroid.  Follow-up if not improving.  - nystatin (MYCOSTATIN) [id number] UNIT/GM Cream topical cream; Apply 1 g topically 2 times a day for 14 days.  Dispense: 30 g; Refill: 0       Follow up as needed.       Laureano Whiting DO, PGY-2  [facility]

## 2025-04-16 NOTE — Clinical Note
REFERRAL APPROVAL NOTICE         Sent on April 16, 2025                   Osmani Fitzpatrick  [address]  [place] [address]                   Dear Mr. Fitzpatrick,    After a careful review of the medical information and benefit coverage, [facility] has processed your referral. See below for additional details.    If applicable, you must be actively enrolled with your insurance for coverage of the authorized service. If you have any questions regarding your coverage, please contact your insurance directly.    REFERRAL INFORMATION   Referral #:  [id number]  Referred-To Department    Referred-By Provider:  Surgery    Laureano Whiting D.O.   Department Of Surgery      [address]  [place] [address]  [phone] [address], [address]  [place] [id number] [phone]    Referral Start Date:  04/10/2025  Referral End Date:   04/10/2026             SCHEDULING  If you do not already have an appointment, please call [phone] to make an appointment.     MORE INFORMATION  If you do not already have a [employer] account, sign up at: [email or web address]  You can access your medical information, make appointments, see lab results, billing information, and more.  If you have questions regarding this referral, please contact  the [facility] Referrals department at:             [phone]. Monday - Friday 8:00AM - 5:00PM.     Sincerely,    [facility]

## (undated) DEVICE — SUTURE 0 VICRYL PLUS UR-6 - 27 INCH (36/BX)

## (undated) DEVICE — NEEDLE SPINAL NON-SAFETY 18 GA X 3 IN (25EA/BX)

## (undated) DEVICE — SPONGE GAUZESTER 4 X 4 4PLY - (128PK/CA)

## (undated) DEVICE — DRAPE SURG STERI-DRAPE 7X11OD - (40EA/CA)

## (undated) DEVICE — SET EXTENSION WITH 2 PORTS (48EA/CA) ***PART #2C8610 IS A SUBSTITUTE*****

## (undated) DEVICE — SUTURE GENERAL

## (undated) DEVICE — TUBE CONNECT SUCTION CLEAR 120 X 1/4" (50EA/CA)"

## (undated) DEVICE — GOWN SURGEONS LARGE - (32/CA)

## (undated) DEVICE — TROCAR Z THREAD12MM OPTICAL - NON BLADED (6/BX)

## (undated) DEVICE — KIT EVACUATER 3 SPRING PVC LF 1/8 DRAIN SIZE (10EA/CA)"

## (undated) DEVICE — BAG RETRIEVAL 10ML (10EA/BX)

## (undated) DEVICE — BONE PRESS SPINAL EDITION HENSLER (10EA/CA)

## (undated) DEVICE — KIT ANESTHESIA W/CIRCUIT & 3/LT BAG W/FILTER (20EA/CA)

## (undated) DEVICE — GLOVE BIOGEL SZ 6.5 SURGICAL PF LTX (50PR/BX 4BX/CA)

## (undated) DEVICE — HOLDER MAGNETIC NEEDLE DEVON - (12EA/BX 8BX/CA)

## (undated) DEVICE — Device

## (undated) DEVICE — MIDAS LUBRICATOR DIFFUSER PACK (4EA/CA)

## (undated) DEVICE — GLOVE BIOGEL PI INDICATOR SZ 7.0 SURGICAL PF LF - (50/BX 4BX/CA)

## (undated) DEVICE — GLOVE BIOGEL PI INDICATOR SZ 7.5 SURGICAL PF LF -(50/BX 4BX/CA)

## (undated) DEVICE — STAPLER 45MM ARTICULATING - ENDO (3EA/BX)

## (undated) DEVICE — INTRAOP NEURO IN OR 1:1 PER 15 MIN

## (undated) DEVICE — DRAPE LAPAROTOMY T SHEET - (12EA/CA)

## (undated) DEVICE — SUTURE 4-0 MONOCRYL PLUS PS-2 - 27 INCH (36/BX)

## (undated) DEVICE — LACTATED RINGERS INJ 1000 ML - (14EA/CA 60CA/PF)

## (undated) DEVICE — GLOVE BIOGEL SZ 8.5 SURGICAL PF LTX - (50PR/BX 4BX/CA)

## (undated) DEVICE — SHEET PEDIATRIC LAPAROTOMY - (10/CA)

## (undated) DEVICE — TOOL DISSECT MATCH HEAD

## (undated) DEVICE — GOWN WARMING STANDARD FLEX - (30/CA)

## (undated) DEVICE — SODIUM CHL IRRIGATION 0.9% 1000ML (12EA/CA)

## (undated) DEVICE — COVER LIGHT HANDLE ALC PLUS DISP (18EA/BX)

## (undated) DEVICE — ELECTRODE 850 FOAM ADHESIVE - HYDROGEL RADIOTRNSPRNT (50/PK)

## (undated) DEVICE — DRAPESURG STERI-DRAPE LONG - (10/BX 4BX/CA)

## (undated) DEVICE — SUCTION INSTRUMENT YANKAUER BULBOUS TIP W/O VENT (50EA/CA)

## (undated) DEVICE — GLOVE BIOGEL PI INDICATOR SZ 8.5 SURGICAL PF LF - (50PR/BX 4BX/CA)

## (undated) DEVICE — TOWEL STOP TIMEOUT SAFETY FLAG (40EA/CA)

## (undated) DEVICE — BOVIE BLADE COATED &INSULATED - 25/PK

## (undated) DEVICE — SENSOR SPO2 NEO LNCS ADHESIVE (20/BX) SEE USER NOTES

## (undated) DEVICE — TUBING C&T SET FLYING LEADS DRAIN TUBING (10EA/BX)

## (undated) DEVICE — DISSECT TOOL MIDAS REX

## (undated) DEVICE — PACK LAP CHOLE OR - (2EA/CA)

## (undated) DEVICE — SET LEADWIRE 5 LEAD BEDSIDE DISPOSABLE ECG (1SET OF 5/EA)

## (undated) DEVICE — GLOVE BIOGEL INDICATOR SZ 7SURGICAL PF LTX - (50/BX 4BX/CA)

## (undated) DEVICE — NEPTUNE 4 PORT MANIFOLD - (20/PK)

## (undated) DEVICE — PACK NEURO - (2EA/CA)

## (undated) DEVICE — TROCAR 5X100 NON BLADED Z-TH - READ KII (6/BX)

## (undated) DEVICE — ELECTRODE DUAL RETURN W/ CORD - (50/PK)

## (undated) DEVICE — SLEEVE, VASO, THIGH, MED

## (undated) DEVICE — CANISTER SUCTION 3000ML MECHANICAL FILTER AUTO SHUTOFF MEDI-VAC NONSTERILE LF DISP  (40EA/CA)

## (undated) DEVICE — CHLORAPREP 26 ML APPLICATOR - ORANGE TINT(25/CA)

## (undated) DEVICE — HEAD HOLDER JUNIOR/ADULT

## (undated) DEVICE — GLOVE SZ 6.5 BIOGEL PI MICRO - PF LF (50PR/BX)

## (undated) DEVICE — TUBING CLEARLINK DUO-VENT - C-FLO (48EA/CA)

## (undated) DEVICE — LACTATED RINGERS INJ. 500 ML - (24EA/CA)

## (undated) DEVICE — DRESSING TRANSPARENT FILM TEGADERM 4 X 4.75" (50EA/BX)"

## (undated) DEVICE — CANNULA W/SEAL 5X100 Z-THRE - ADED KII (12/BX)

## (undated) DEVICE — MASK ANESTHESIA ADULT  - (100/CA)

## (undated) DEVICE — TROCAR Z THREAD 12 X 100 - BLADED (6/BX)

## (undated) DEVICE — NEEDLE INSFL 120MM 14GA VRRS - (20/BX)

## (undated) DEVICE — VESSEL DIVIDER SEAL LAP LIGASURE 37CM (6EA/BX)

## (undated) DEVICE — SET TUBING PNEUMOCLEAR HIGH FLOW SMOKE EVACUATION (10EA/BX)

## (undated) DEVICE — BLADE SURGICAL CLIPPER - (50EA/CA)

## (undated) DEVICE — GLOVE, BIOGEL ECLIPSE, SZ 7.0, PF LTX (50/BX)

## (undated) DEVICE — SUTURE 3-0 VICRYL PLUS RB-1 - 8 X 18 INCH (12/BX)

## (undated) DEVICE — LIGHT SOURCE MIS 12FT

## (undated) DEVICE — PROTECTOR ULNA NERVE - (36PR/CA)

## (undated) DEVICE — SENSOR OXIMETER ADULT SPO2 RD SET (20EA/BX)

## (undated) DEVICE — SPONGE PEANUT - (5/PK 50PK/CA)

## (undated) DEVICE — SYRINGE NON SAFETY 3 CC 21 GA X 1 1/2 IN (100/BX 8BX/CA)

## (undated) DEVICE — GLOVE SZ 7 BIOGEL PI MICRO - PF LF (50PR/BX 4BX/CA)

## (undated) DEVICE — KIT SURGIFLO W/OUT THROMBIN - (6EA/CA)